# Patient Record
Sex: FEMALE | Race: WHITE
[De-identification: names, ages, dates, MRNs, and addresses within clinical notes are randomized per-mention and may not be internally consistent; named-entity substitution may affect disease eponyms.]

---

## 2019-11-19 ENCOUNTER — APPOINTMENT (OUTPATIENT)
Dept: GYNECOLOGIC ONCOLOGY | Facility: CLINIC | Age: 59
End: 2019-11-19

## 2019-11-19 PROBLEM — Z00.00 ENCOUNTER FOR PREVENTIVE HEALTH EXAMINATION: Status: ACTIVE | Noted: 2019-11-19

## 2021-10-05 ENCOUNTER — APPOINTMENT (OUTPATIENT)
Dept: BREAST CENTER | Facility: CLINIC | Age: 61
End: 2021-10-05
Payer: COMMERCIAL

## 2021-10-05 VITALS
HEIGHT: 67 IN | TEMPERATURE: 97.4 F | BODY MASS INDEX: 28.88 KG/M2 | DIASTOLIC BLOOD PRESSURE: 76 MMHG | WEIGHT: 184 LBS | SYSTOLIC BLOOD PRESSURE: 130 MMHG

## 2021-10-05 DIAGNOSIS — Z87.891 PERSONAL HISTORY OF NICOTINE DEPENDENCE: ICD-10-CM

## 2021-10-05 DIAGNOSIS — Z86.79 PERSONAL HISTORY OF OTHER DISEASES OF THE CIRCULATORY SYSTEM: ICD-10-CM

## 2021-10-05 DIAGNOSIS — Z86.39 PERSONAL HISTORY OF OTHER ENDOCRINE, NUTRITIONAL AND METABOLIC DISEASE: ICD-10-CM

## 2021-10-05 DIAGNOSIS — Z80.0 FAMILY HISTORY OF MALIGNANT NEOPLASM OF DIGESTIVE ORGANS: ICD-10-CM

## 2021-10-05 DIAGNOSIS — Z78.9 OTHER SPECIFIED HEALTH STATUS: ICD-10-CM

## 2021-10-05 PROCEDURE — 99203 OFFICE O/P NEW LOW 30 MIN: CPT

## 2021-10-05 RX ORDER — LEVOTHYROXINE SODIUM 0.17 MG/1
TABLET ORAL
Refills: 0 | Status: ACTIVE | COMMUNITY

## 2021-10-05 RX ORDER — RAMIPRIL 5 MG/1
5 CAPSULE ORAL
Refills: 0 | Status: ACTIVE | COMMUNITY

## 2021-10-05 RX ORDER — MELOXICAM 15 MG/1
TABLET ORAL
Refills: 0 | Status: ACTIVE | COMMUNITY

## 2021-10-05 RX ORDER — PANTOPRAZOLE SODIUM 20 MG/1
TABLET, DELAYED RELEASE ORAL
Refills: 0 | Status: ACTIVE | COMMUNITY

## 2021-10-05 NOTE — HISTORY OF PRESENT ILLNESS
[FreeTextEntry1] : GYN: Dr. Lois Smallwood\par \par ENMANUEL SHELTON is a 60 year old female patient who presents today to establish care.\par (Former pt of Dr. Jon)\par As per pt, she has history of benign left breast core bx x 2.  She also underwent a left breast lumpectomy with benign results.\par \par She has no breast related complaints at this time.  She denies any breast pain, has not palpated any new palpable masses in either breast and denies any nipple discharge or retraction.\par \par Her most recent imaging with  bilateral screening mammogram and sonogram was done on 11/10/2020, which revealed:\par - dense breast parenchyma.\par - postsurgical changes in the left breast are stable.\par - stable scattered nodularity in the left breast.\par BI-RADS CATEGORY 2 (Benign)\par \par HISTORICAL RISK FACTORS:\par -history of benign left breast core bx x 2.  She also underwent a left breast lumpectomy with benign results.\par -family history of colon cancer - father 83y/o.\par -, age at first live birth was 20.\par -prior OCP use x 1-2 years.\par -s/p TAHBSO (2019).\par

## 2021-10-05 NOTE — PAST MEDICAL HISTORY
[Postmenopausal] : The patient is postmenopausal [Menarche Age ____] : age at menarche was [unfilled] [Menopause Age____] : age at menopause was [unfilled] [Total Preg ___] : G[unfilled] [Live Births ___] : P[unfilled]  [Age At Live Birth ___] : Age at live birth: [unfilled] [History of Hormone Replacement Treatment] : has no history of hormone replacement treatment [FreeTextEntry6] : No. [FreeTextEntry7] : Yes. 1-2 years. [FreeTextEntry8] : Yes.

## 2021-10-05 NOTE — REVIEW OF SYSTEMS
[Negative] : Musculoskeletal [Breast Pain] : no breast pain [Breast Lump] : no breast lump [Nipple Discharge] : no nipple discharge [Nipple Inverted] : no inversion of the nipple

## 2021-10-05 NOTE — DATA REVIEWED
[FreeTextEntry1] : (complete report scanned into EMR)\par B/L Screening Mammogram & Sonogram - 11/10/2020:\par - dense breast parenchyma.\par - postsurgical changes in the left breast are stable.\par - stable scattered nodularity in the left breast.\par BI-RADS CATEGORY 2 (Benign)

## 2021-10-05 NOTE — PHYSICAL EXAM
[Normocephalic] : normocephalic [Atraumatic] : atraumatic [EOMI] : extra ocular movement intact [No Supraclavicular Adenopathy] : no supraclavicular adenopathy [No Cervical Adenopathy] : no cervical adenopathy [No dominant masses] : no dominant masses in right breast  [No dominant masses] : no dominant masses left breast [No Nipple Retraction] : no left nipple retraction [No Nipple Discharge] : no left nipple discharge [No Axillary Lymphadenopathy] : no left axillary lymphadenopathy [Soft] : abdomen soft [Not Tender] : non-tender [No Edema] : no edema [No Rashes] : no rashes [No Ulceration] : no ulceration [de-identified] : no suspicious abnormalities palpated within either breast

## 2021-10-05 NOTE — ASSESSMENT
[FreeTextEntry1] : ENMAUNEL SHELTON is a 60 year old female patient who presents today to \[facility]\"" care.\par (Former pt of Dr. Jon)\par \par She has no breast related complaints at this time.  She denies any breast pain, has not palpated any new palpable masses in either breast and denies any nipple discharge or retraction.\par \par Her most recent imaging with bilateral screening mammogram and sonogram was done on 11/10/2020, which revealed dense breast parenchyma, postsurgical changes in the left breast are stable and stable scattered nodularity in the left breast.  This was deemed BIRADS category 2.\par \par On physical exam, there are no discrete masses in either breast or axilla.  There is no nipple discharge or inversion bilaterally.  There are no skin changes bilaterally.  \par \par We discussed dense breasts.  Women who have dense breast tissue have a higher risk of breast cancer compared to women with less dense breast tissue.  It is unclear at this time why dense breast tissue is linked to breast cancer risk.  One can consider bilateral screening ultrasound for patients with heterogeneously to extremely dense breasts.  However, out of 1000 women screened, the use of routine ultrasound will only identify an additional 3-5 cancers.  The use of ultrasound was found to increase the likelihood of undergoing more imaging and more biopsies.  She does have dense breasts.  We have decided to proceed with screening bilateral breast ultrasound at this time.  This will be scheduled with her next screening mammogram.  \par \par PLAN: \par -b/l dx mammogram and US on 11/2021 \par -if unrevealing, f/up in 1 year for a CBE after a b/l screening mammogram and US

## 2021-11-15 ENCOUNTER — OUTPATIENT (OUTPATIENT)
Dept: OUTPATIENT SERVICES | Facility: HOSPITAL | Age: 61
LOS: 1 days | Discharge: HOME | End: 2021-11-15
Payer: COMMERCIAL

## 2021-11-15 ENCOUNTER — RESULT REVIEW (OUTPATIENT)
Age: 61
End: 2021-11-15

## 2021-11-15 DIAGNOSIS — R92.8 OTHER ABNORMAL AND INCONCLUSIVE FINDINGS ON DIAGNOSTIC IMAGING OF BREAST: ICD-10-CM

## 2021-11-15 PROCEDURE — 77063 BREAST TOMOSYNTHESIS BI: CPT | Mod: 26

## 2021-11-15 PROCEDURE — 77067 SCR MAMMO BI INCL CAD: CPT | Mod: 26

## 2021-11-15 PROCEDURE — 76641 ULTRASOUND BREAST COMPLETE: CPT | Mod: 26,50

## 2023-01-30 ENCOUNTER — RESULT REVIEW (OUTPATIENT)
Age: 63
End: 2023-01-30

## 2023-01-30 ENCOUNTER — OUTPATIENT (OUTPATIENT)
Dept: OUTPATIENT SERVICES | Facility: HOSPITAL | Age: 63
LOS: 1 days | Discharge: HOME | End: 2023-01-30
Payer: COMMERCIAL

## 2023-01-30 DIAGNOSIS — Z12.31 ENCOUNTER FOR SCREENING MAMMOGRAM FOR MALIGNANT NEOPLASM OF BREAST: ICD-10-CM

## 2023-01-30 PROCEDURE — 77067 SCR MAMMO BI INCL CAD: CPT | Mod: 26

## 2023-01-30 PROCEDURE — 77063 BREAST TOMOSYNTHESIS BI: CPT | Mod: 26

## 2023-02-20 ENCOUNTER — RESULT REVIEW (OUTPATIENT)
Age: 63
End: 2023-02-20

## 2023-02-20 ENCOUNTER — OUTPATIENT (OUTPATIENT)
Dept: OUTPATIENT SERVICES | Facility: HOSPITAL | Age: 63
LOS: 1 days | End: 2023-02-20
Payer: COMMERCIAL

## 2023-02-20 DIAGNOSIS — R92.2 INCONCLUSIVE MAMMOGRAM: ICD-10-CM

## 2023-02-20 DIAGNOSIS — Z00.8 ENCOUNTER FOR OTHER GENERAL EXAMINATION: ICD-10-CM

## 2023-02-20 PROCEDURE — 76641 ULTRASOUND BREAST COMPLETE: CPT | Mod: 26,50

## 2023-02-20 PROCEDURE — 76641 ULTRASOUND BREAST COMPLETE: CPT | Mod: 50

## 2023-02-21 ENCOUNTER — NON-APPOINTMENT (OUTPATIENT)
Age: 63
End: 2023-02-21

## 2023-02-22 ENCOUNTER — NON-APPOINTMENT (OUTPATIENT)
Age: 63
End: 2023-02-22

## 2023-02-23 ENCOUNTER — NON-APPOINTMENT (OUTPATIENT)
Age: 63
End: 2023-02-23

## 2023-03-13 ENCOUNTER — APPOINTMENT (OUTPATIENT)
Dept: BREAST CENTER | Facility: CLINIC | Age: 63
End: 2023-03-13
Payer: COMMERCIAL

## 2023-03-13 DIAGNOSIS — N60.19 DIFFUSE CYSTIC MASTOPATHY OF UNSPECIFIED BREAST: ICD-10-CM

## 2023-03-13 DIAGNOSIS — R92.2 INCONCLUSIVE MAMMOGRAM: ICD-10-CM

## 2023-03-13 DIAGNOSIS — R92.8 OTHER ABNORMAL AND INCONCLUSIVE FINDINGS ON DIAGNOSTIC IMAGING OF BREAST: ICD-10-CM

## 2023-03-13 PROCEDURE — 99213 OFFICE O/P EST LOW 20 MIN: CPT

## 2023-03-13 NOTE — PHYSICAL EXAM
[Normocephalic] : normocephalic [Atraumatic] : atraumatic [EOMI] : extra ocular movement intact [Examined in the supine and seated position] : examined in the supine and seated position [Symmetrical] : symmetrical [No dominant masses] : no dominant masses in right breast  [No dominant masses] : no dominant masses left breast [No Nipple Retraction] : no left nipple retraction [No Nipple Discharge] : no left nipple discharge [No Axillary Lymphadenopathy] : no left axillary lymphadenopathy [No Edema] : no edema [No Rashes] : no rashes [No Ulceration] : no ulceration [de-identified] : On physical exam, there are no discrete masses in either breast or axilla. There is no nipple discharge or inversion bilaterally. There are no skin changes bilaterally.\par \par  NPO

## 2023-03-13 NOTE — DATA REVIEWED
[FreeTextEntry1] : ACC: [id number]     EXAM:  MG MAMMO SCREEN W [facility]WRENCE BI#  \par :  01/30/2023\par INTERPRETATION:  HISTORY:\par Bilateral MG MAMMO SCREEN W [facility] BI# was performed. Patient is 62 years old and is seen for screening. The patient has no personal history of cancer. The patient has a history of left needle biopsy - benign. The patient has no family history of breast cancer.\par \par RISK ASSESSMENT:\par NCI Lifetime Risk: 6.5\par Rajiv-Neal Lifetime Risk: 6.6\par \par CLINICAL BREAST EXAM:\par The patient reports their last clinical breast exam was performed within the past year.\par \par COMPARISON STUDIES:\par The present examination has been compared to prior imaging studies performed at [facility] on 11/15/2021, and at an outside location on 01/17/2019, 10/04/2019, 02/14/2020 and 11/10/2020.\par \par MAMMOGRAM FINDINGS:\par Mammography was performed including the following views: bilateral craniocaudal with tomosynthesis, bilateral mediolateral oblique with tomosynthesis.  The examination includes digital synthetic 2D and digital tomosynthesis 3D images. Additional imaging analysis was performed using CAD (computer-aided detection) software.\par \par The breasts are heterogeneously dense, which may obscure small masses.\par \par There are stable benign masses seen in both breasts.\par \par No suspicious mass, grouping of calcifications, or other abnormality is identified.\par \par IMPRESSION:\par There is no mammographic evidence of malignancy.\par \par RECOMMENDATION:\par Unless otherwise indicated by clinical findings, annual screening mammography recommended.\par \par ASSESSMENT:\par BI-RADS Category 2:  Benign\par  ACC: [id number]     EXAM:  MG US BREAST COMPLETE BI   ORDERED BY: ANIRUDH MATHIS\par \par PROCEDURE DATE:  02/20/2023\par \par \par \par INTERPRETATION:  CLINICAL HISTORY: Fibrocystic breast changes. Dense breast screening.\par \par COMPARISON: 11/15/2021.\par \par Last clinical breast examination was performed within the past year.\par \par FAMILY HISTORY: None.\par \par TECHNIQUE: Each breast was scanned in its entirety.\par \par \par FINDINGS:\par \par Oval, circumscribed, parallel, hypoechoic mass in the left breast, 2:00 axis, 7 cm from the nipple measures 0.7 x 0.5 x 0.3 cm. This is probably benign and continued sonographic follow-up is recommended to demonstrate stability. Stable, oval, hypoechoic mass in the left breast, 12:00 axis, 6 7 cm from the nipple measures 1.3 x 1.3 x 0.4 cm, unchanged from 11/15/2021 consistent with a benign etiology. No suspicious masses are identified in either breast.\par \par There is no axillary adenopathy.\par \par \par IMPRESSION:\par \par 1. Probably benign left breast mass as above. Sonographic follow-up is recommended in 6 months to demonstrate stability.\par 2. No sonographic evidence of malignancy in the right breast.\par \par Recommendation: Follow-up breast ultrasound in 6 months.\par \par BI-RADS category 3: Probably Benign\par \par

## 2023-03-13 NOTE — HISTORY OF PRESENT ILLNESS
[FreeTextEntry1] : GYN: Dr. Lois Smallwood\par \par FIDENCIO SHELTON is a 60 year old female patient who presents today to establish care.\par (Former pt of Dr. Jon)\par As per pt, she has history of benign left breast core bx x 2.  She also underwent a left breast lumpectomy with benign results.\par \par She has no breast related complaints at this time.  She denies any breast pain, has not palpated any new palpable masses in either breast and denies any nipple discharge or retraction.\par \par Her most recent imaging with  bilateral screening mammogram and sonogram was done on 11/10/2020, which revealed:\par - dense breast parenchyma.\par - postsurgical changes in the left breast are stable.\par - stable scattered nodularity in the left breast.\par BI-RADS CATEGORY 2 (Benign)\par \par HISTORICAL RISK FACTORS:\par -history of benign left breast core bx x 2.  She also underwent a left breast lumpectomy with benign results.\par -family history of colon cancer - father 81y/o.\par -, age at first live birth was 20.\par -prior OCP use x 1-2 years.\par -s/p TAHBSO (2019).\par \par \par INTERVAL HISTORY 3/13/23\par Fidencio is 62 year old female here for her follow up and to discuss her new BIRADS3 abnormality \par She has no breast related complaints at this time.  She denies any breast pain, has not palpated any new palpable masses in either breast and denies any nipple discharge or retraction.\par \par Her imaging is as follows:\par 2023 b/l mammo and US\par -breasts are heterogeneously dense\par - stable benign masses seen in both breasts.\par BIRADS2\par \par \par 2023 B/L US\par LEFT US:\par -Oval, circumscribed, parallel, hypoechoic mass @2N7  measures 0.7 x 0.5 x 0.3 cm-->This is probably benign and continued sonographic follow-up is recommended to demonstrate stability. \par -Stable, oval, hypoechoic mass in the left breast, 12N 6 7  measures 1.3 x 1.3 x 0.4 cm, unchanged from 11/15/2021 consistent with a benign etiology.\par BIRADS3

## 2023-03-13 NOTE — ASSESSMENT
[FreeTextEntry1] : ENMANUEL SHELTON is a 62 year old female patient with new BIRADS3 abnormality \par \par She has no breast related complaints at this time.  She denies any breast pain, has not palpated any new palpable masses in either breast and denies any nipple discharge or retraction.\par Her imaging is as follows:\par 01/30/2023 b/l mammo and US\par -breasts are heterogeneously dense\par - stable benign masses seen in both breasts.\par BIRADS2\par \par \par 02/20/2023 B/L US\par LEFT US:\par -Oval, circumscribed, parallel, hypoechoic mass @2N7  measures 0.7 x 0.5 x 0.3 cm-->This is probably benign and continued sonographic follow-up is recommended to demonstrate stability. \par -Stable, oval, hypoechoic mass in the left breast, 12N 6 7  measures 1.3 x 1.3 x 0.4 cm, unchanged from 11/15/2021 consistent with a benign etiology.\par BIRADS3\par \par On physical exam, there are no discrete masses in either breast or axilla.  There is no nipple discharge or inversion bilaterally.  There are no skin changes bilaterally.  \par \par \par We also discussed BIRADS 3 lesions.  These lesions have a 2% chance of harboring malignancy.  There is always an option to obtain a tissue sample with a biopsy to confirm the diagnosis.   The procedure was described in detail including the placement of a tissue marker clip.  The risks of the procedure, including but not limited to bleeding and infection were also explained.  She is not interested in biopsy at this time and agrees to continue with short-term interval imaging.\par \par \par We discussed dense breasts.  Women who have dense breast tissue have a higher risk of breast cancer compared to women with less dense breast tissue.  It is unclear at this time why dense breast tissue is linked to breast cancer risk.  One can consider bilateral screening ultrasound for patients with heterogeneously to extremely dense breasts.  However, out of 1000 women screened, the use of routine ultrasound will only identify an additional 3-5 cancers.  The use of ultrasound was found to increase the likelihood of undergoing more imaging and more biopsies.  She does have dense breasts.  We have decided to proceed with screening bilateral breast ultrasound at this time.  This will be scheduled with her next screening mammogram.  \par \par PLAN: \par -LEFT US on 8/21/23\par -follow up after

## 2023-03-20 ENCOUNTER — APPOINTMENT (OUTPATIENT)
Dept: PLASTIC SURGERY | Facility: CLINIC | Age: 63
End: 2023-03-20
Payer: COMMERCIAL

## 2023-03-20 VITALS — WEIGHT: 180 LBS | BODY MASS INDEX: 28.25 KG/M2 | HEIGHT: 67 IN

## 2023-03-20 PROCEDURE — 99214 OFFICE O/P EST MOD 30 MIN: CPT

## 2023-03-20 RX ORDER — IBUPROFEN 200 MG/1
TABLET, COATED ORAL
Refills: 0 | Status: ACTIVE | COMMUNITY

## 2023-03-20 RX ORDER — MV-MIN/FOLIC/VIT K/LYCOP/COQ10 200-100MCG
CAPSULE ORAL
Refills: 0 | Status: ACTIVE | COMMUNITY

## 2023-03-20 NOTE — PHYSICAL EXAM
[de-identified] : well developed female, NAD [de-identified] : NC/AT,  central forehead with bony prominence measuring 1x0.8 cm, non tender, no overlying skin changes, intact forehead animation without soft tissue tethering on animation.  Facial nerve exam intact  [de-identified] : supple [de-identified] : unlabored breathing  [de-identified] : ERICR [de-identified] : soft, nontender  [de-identified] : Left hand - warm and well perfused, FROM/SILT, MF with small cystic subcutaneous mass over volar aspect of PIPJ, no tethering to underlying FTS

## 2023-03-20 NOTE — ASSESSMENT
[FreeTextEntry1] : 61 yo F with central forehead exostosis and left MF PIPJ subcutaneous cyst. \par \par - Recommend excision of central forehead exostosis and left middle finger cyst\par -I reviewed the treatment options of observation vs. surgical excision, and I discussed the benefits, risks, and outcomes of each treatment option.\par -Regarding observation, the risks include progressive mass enlargement, mass effect of surrounding structures.\par -Regarding surgical excision of mass, the risks include but are not limited to bleeding, infection, hematoma,  numbness, wound separation, poor wound healing, recurrence of mass, contour defect, scarring, hypertrophic scar/keloid formation, need for unplanned surgery, and dissatisfaction with outcome.\par -The patient understands the risks. All questions were answered to the patient's apparent satisfaction.\par -Informed consent was obtained.\par -will schedule for TASNEEM procedures under [facility] ([facility])\par \par Due to COVID-19, pre-visit patient instructions were explained to the patient and their symptoms were checked upon arrival. Masks were used by the healthcare provider and staff and the examination room was cleaned after the patient visit concluded\par

## 2023-03-20 NOTE — HISTORY OF PRESENT ILLNESS
[FreeTextEntry1] : 63 yo F with PMHx of HTN, Hypothyroid and GERD who is RHD and presents today for evaluation of new firm forehead lesion for the past several months and left middle finger cyst for few years with increasing size and slight discomfort on contact. Denies any paresthesia, numbness in digits. \par Pt has h/o premalignant back skin lesion otherwise no h/o Skin Cancer. \par No imaging. \par \par Occupation - retired \par Former smoker \par \par Denies ASA\par \par

## 2023-04-04 ENCOUNTER — OUTPATIENT (OUTPATIENT)
Dept: OUTPATIENT SERVICES | Facility: HOSPITAL | Age: 63
LOS: 1 days | End: 2023-04-04
Payer: COMMERCIAL

## 2023-04-04 ENCOUNTER — RESULT REVIEW (OUTPATIENT)
Age: 63
End: 2023-04-04

## 2023-04-04 VITALS
OXYGEN SATURATION: 99 % | SYSTOLIC BLOOD PRESSURE: 119 MMHG | HEART RATE: 73 BPM | TEMPERATURE: 99 F | DIASTOLIC BLOOD PRESSURE: 76 MMHG | HEIGHT: 67 IN | WEIGHT: 181.44 LBS | RESPIRATION RATE: 15 BRPM

## 2023-04-04 DIAGNOSIS — Z98.890 OTHER SPECIFIED POSTPROCEDURAL STATES: Chronic | ICD-10-CM

## 2023-04-04 DIAGNOSIS — Z01.818 ENCOUNTER FOR OTHER PREPROCEDURAL EXAMINATION: ICD-10-CM

## 2023-04-04 DIAGNOSIS — M89.8X9 OTHER SPECIFIED DISORDERS OF BONE, UNSPECIFIED SITE: ICD-10-CM

## 2023-04-04 LAB
ALBUMIN SERPL ELPH-MCNC: 5.1 G/DL — SIGNIFICANT CHANGE UP (ref 3.5–5.2)
ALP SERPL-CCNC: 80 U/L — SIGNIFICANT CHANGE UP (ref 30–115)
ALT FLD-CCNC: 17 U/L — SIGNIFICANT CHANGE UP (ref 0–41)
ANION GAP SERPL CALC-SCNC: 16 MMOL/L — HIGH (ref 7–14)
APTT BLD: 30 SEC — SIGNIFICANT CHANGE UP (ref 27–39.2)
AST SERPL-CCNC: 17 U/L — SIGNIFICANT CHANGE UP (ref 0–41)
BASOPHILS # BLD AUTO: 0.03 K/UL — SIGNIFICANT CHANGE UP (ref 0–0.2)
BASOPHILS NFR BLD AUTO: 0.4 % — SIGNIFICANT CHANGE UP (ref 0–1)
BILIRUB SERPL-MCNC: 0.4 MG/DL — SIGNIFICANT CHANGE UP (ref 0.2–1.2)
BUN SERPL-MCNC: 20 MG/DL — SIGNIFICANT CHANGE UP (ref 10–20)
CALCIUM SERPL-MCNC: 9.5 MG/DL — SIGNIFICANT CHANGE UP (ref 8.4–10.5)
CHLORIDE SERPL-SCNC: 103 MMOL/L — SIGNIFICANT CHANGE UP (ref 98–110)
CO2 SERPL-SCNC: 24 MMOL/L — SIGNIFICANT CHANGE UP (ref 17–32)
CREAT SERPL-MCNC: 0.8 MG/DL — SIGNIFICANT CHANGE UP (ref 0.7–1.5)
EGFR: 83 ML/MIN/1.73M2 — SIGNIFICANT CHANGE UP
EOSINOPHIL # BLD AUTO: 0.12 K/UL — SIGNIFICANT CHANGE UP (ref 0–0.7)
EOSINOPHIL NFR BLD AUTO: 1.7 % — SIGNIFICANT CHANGE UP (ref 0–8)
GLUCOSE SERPL-MCNC: 77 MG/DL — SIGNIFICANT CHANGE UP (ref 70–99)
HCT VFR BLD CALC: 40.9 % — SIGNIFICANT CHANGE UP (ref 37–47)
HGB BLD-MCNC: 13 G/DL — SIGNIFICANT CHANGE UP (ref 12–16)
IMM GRANULOCYTES NFR BLD AUTO: 0.3 % — SIGNIFICANT CHANGE UP (ref 0.1–0.3)
INR BLD: 0.92 RATIO — SIGNIFICANT CHANGE UP (ref 0.65–1.3)
LYMPHOCYTES # BLD AUTO: 3.18 K/UL — SIGNIFICANT CHANGE UP (ref 1.2–3.4)
LYMPHOCYTES # BLD AUTO: 45.2 % — SIGNIFICANT CHANGE UP (ref 20.5–51.1)
MCHC RBC-ENTMCNC: 29.1 PG — SIGNIFICANT CHANGE UP (ref 27–31)
MCHC RBC-ENTMCNC: 31.8 G/DL — LOW (ref 32–37)
MCV RBC AUTO: 91.5 FL — SIGNIFICANT CHANGE UP (ref 81–99)
MONOCYTES # BLD AUTO: 0.53 K/UL — SIGNIFICANT CHANGE UP (ref 0.1–0.6)
MONOCYTES NFR BLD AUTO: 7.5 % — SIGNIFICANT CHANGE UP (ref 1.7–9.3)
NEUTROPHILS # BLD AUTO: 3.16 K/UL — SIGNIFICANT CHANGE UP (ref 1.4–6.5)
NEUTROPHILS NFR BLD AUTO: 44.9 % — SIGNIFICANT CHANGE UP (ref 42.2–75.2)
NRBC # BLD: 0 /100 WBCS — SIGNIFICANT CHANGE UP (ref 0–0)
PLATELET # BLD AUTO: 288 K/UL — SIGNIFICANT CHANGE UP (ref 130–400)
POTASSIUM SERPL-MCNC: 4.2 MMOL/L — SIGNIFICANT CHANGE UP (ref 3.5–5)
POTASSIUM SERPL-SCNC: 4.2 MMOL/L — SIGNIFICANT CHANGE UP (ref 3.5–5)
PROT SERPL-MCNC: 7.1 G/DL — SIGNIFICANT CHANGE UP (ref 6–8)
PROTHROM AB SERPL-ACNC: 10.5 SEC — SIGNIFICANT CHANGE UP (ref 9.95–12.87)
RBC # BLD: 4.47 M/UL — SIGNIFICANT CHANGE UP (ref 4.2–5.4)
RBC # FLD: 14 % — SIGNIFICANT CHANGE UP (ref 11.5–14.5)
SODIUM SERPL-SCNC: 143 MMOL/L — SIGNIFICANT CHANGE UP (ref 135–146)
WBC # BLD: 7.04 K/UL — SIGNIFICANT CHANGE UP (ref 4.8–10.8)
WBC # FLD AUTO: 7.04 K/UL — SIGNIFICANT CHANGE UP (ref 4.8–10.8)

## 2023-04-04 PROCEDURE — 99214 OFFICE O/P EST MOD 30 MIN: CPT | Mod: 25

## 2023-04-04 PROCEDURE — 85610 PROTHROMBIN TIME: CPT

## 2023-04-04 PROCEDURE — 93010 ELECTROCARDIOGRAM REPORT: CPT

## 2023-04-04 PROCEDURE — 71046 X-RAY EXAM CHEST 2 VIEWS: CPT

## 2023-04-04 PROCEDURE — 36415 COLL VENOUS BLD VENIPUNCTURE: CPT

## 2023-04-04 PROCEDURE — 85025 COMPLETE CBC W/AUTO DIFF WBC: CPT

## 2023-04-04 PROCEDURE — 85730 THROMBOPLASTIN TIME PARTIAL: CPT

## 2023-04-04 PROCEDURE — 71046 X-RAY EXAM CHEST 2 VIEWS: CPT | Mod: 26

## 2023-04-04 PROCEDURE — 93005 ELECTROCARDIOGRAM TRACING: CPT

## 2023-04-04 PROCEDURE — 80053 COMPREHEN METABOLIC PANEL: CPT

## 2023-04-04 NOTE — H&P PST ADULT - HISTORY OF PRESENT ILLNESS
PT PRESENTS TO PAST WITH NO SOB, CP, PALPITATIONS, DYSURIA, UTI OR URI AT PRESENT.   PT ABLE TO WALK UP 2-3 FLIGHTS OF STEPS WITH NO SOB.  AS PER THE PT, THIS IS HIS/HER COMPLETE MEDICAL AND SURGICAL HX, INCLUDING MEDICATIONS PRESCRIBED AND OVER THE COUNTER  pt denies any covid s/s, or tested positive in the past  pt advised self quarantine till day of procedure  denies travel outside the [place] in the past 30 days  Anesthesia Alert  NO--Difficult Airway  NO--History of neck surgery or radiation  NO--Limited ROM of neck  NO--History of Malignant hyperthermia  NO--Personal or family history of Pseudocholinesterase deficiency  NO--Prior Anesthesia Complication  NO--Latex Allergy  NO--Loose teeth  NO--History of Rheumatoid Arthritis  NO--NAVNEET  NO BLEEDING RISK  NO--Other_____

## 2023-04-04 NOTE — H&P PST ADULT - NSICDXPASTMEDICALHX_GEN_ALL_CORE_FT
PAST MEDICAL HISTORY:  GERD (gastroesophageal reflux disease)     HTN (hypertension)     Hypothyroidism

## 2023-04-04 NOTE — H&P PST ADULT - REASON FOR ADMISSION
Proceduralist: Vivian Gay  Procedure: FOREHEAD REMOVAL OF EXOSTOSIS EXCISION OF LEFT MIDDLE FINGER MASS  Procedure: 60 Minutes  Anesthesia Type: General  PT STATES- I HAVE A LUMP ON MY FOREHEAD AND ON MY LEFT MIDDLE FINGER FOR A FEW A COUPLE OF YEARS.   PT DENIES HAVING ANY PAIN

## 2023-04-04 NOTE — H&P PST ADULT - ADDITIONAL PE
AIRWAY CLASS--3  PT HAS UPPER & LOWER CROWNS- PT DENIES HAVING ANY LOOSE TEETH AIRWAY CLASS--3  PT HAS UPPER & LOWER CROWNS- PT DENIES HAVING ANY LOOSE TEETH  exostosis  on forehead noted- round raised no drainage  left middle finger mass noted- round- non tender no drainage noted.

## 2023-04-05 DIAGNOSIS — Z01.818 ENCOUNTER FOR OTHER PREPROCEDURAL EXAMINATION: ICD-10-CM

## 2023-04-05 DIAGNOSIS — M89.8X9 OTHER SPECIFIED DISORDERS OF BONE, UNSPECIFIED SITE: ICD-10-CM

## 2023-04-24 NOTE — ASU PATIENT PROFILE, ADULT - NSICDXPASTMEDICALHX_GEN_ALL_CORE_FT
PAST MEDICAL HISTORY:  GERD (gastroesophageal reflux disease)     H/O varicose veins     H/O: pneumonia s/p hysterectomy    HTN (hypertension)     Hypothyroidism

## 2023-04-24 NOTE — ASU PATIENT PROFILE, ADULT - NSICDXPASTSURGICALHX_GEN_ALL_CORE_FT
PAST SURGICAL HISTORY:  H/O left breast biopsy     H/O ligation of vein left leg    S/P hysterectomy     S/P left knee arthroscopy

## 2023-04-24 NOTE — ASU PATIENT PROFILE, ADULT - FALL HARM RISK - UNIVERSAL INTERVENTIONS
Bed in lowest position, wheels locked, appropriate side rails in place/Call bell, personal items and telephone in reach/Instruct patient to call for assistance before getting out of bed or chair/Non-slip footwear when patient is out of bed/[place] to call system/Physically safe environment - no spills, clutter or unnecessary equipment/Purposeful Proactive Rounding/Room/bathroom lighting operational, light cord in reach

## 2023-04-25 ENCOUNTER — APPOINTMENT (OUTPATIENT)
Dept: PLASTIC SURGERY | Facility: AMBULATORY SURGERY CENTER | Age: 63
End: 2023-04-25
Payer: COMMERCIAL

## 2023-04-25 ENCOUNTER — RESULT REVIEW (OUTPATIENT)
Age: 63
End: 2023-04-25

## 2023-04-25 ENCOUNTER — OUTPATIENT (OUTPATIENT)
Dept: INPATIENT UNIT | Facility: HOSPITAL | Age: 63
LOS: 1 days | Discharge: HOME | End: 2023-04-25
Payer: COMMERCIAL

## 2023-04-25 ENCOUNTER — TRANSCRIPTION ENCOUNTER (OUTPATIENT)
Age: 63
End: 2023-04-25

## 2023-04-25 VITALS
OXYGEN SATURATION: 99 % | RESPIRATION RATE: 18 BRPM | TEMPERATURE: 98 F | SYSTOLIC BLOOD PRESSURE: 124 MMHG | WEIGHT: 181.44 LBS | HEIGHT: 67 IN | HEART RATE: 63 BPM | DIASTOLIC BLOOD PRESSURE: 80 MMHG

## 2023-04-25 VITALS — SYSTOLIC BLOOD PRESSURE: 108 MMHG | DIASTOLIC BLOOD PRESSURE: 64 MMHG | TEMPERATURE: 98 F | HEART RATE: 62 BPM

## 2023-04-25 DIAGNOSIS — Z98.890 OTHER SPECIFIED POSTPROCEDURAL STATES: Chronic | ICD-10-CM

## 2023-04-25 DIAGNOSIS — D23.62 OTHER BENIGN NEOPLASM OF SKIN OF LEFT UPPER LIMB, INCLUDING SHOULDER: ICD-10-CM

## 2023-04-25 DIAGNOSIS — M67.442 GANGLION, LEFT HAND: ICD-10-CM

## 2023-04-25 DIAGNOSIS — Z90.710 ACQUIRED ABSENCE OF BOTH CERVIX AND UTERUS: Chronic | ICD-10-CM

## 2023-04-25 DIAGNOSIS — K21.9 GASTRO-ESOPHAGEAL REFLUX DISEASE WITHOUT ESOPHAGITIS: ICD-10-CM

## 2023-04-25 DIAGNOSIS — D16.9 BENIGN NEOPLASM OF BONE AND ARTICULAR CARTILAGE, UNSPECIFIED: ICD-10-CM

## 2023-04-25 DIAGNOSIS — E03.9 HYPOTHYROIDISM, UNSPECIFIED: ICD-10-CM

## 2023-04-25 DIAGNOSIS — M89.8X9 OTHER SPECIFIED DISORDERS OF BONE, UNSPECIFIED SITE: ICD-10-CM

## 2023-04-25 DIAGNOSIS — I10 ESSENTIAL (PRIMARY) HYPERTENSION: ICD-10-CM

## 2023-04-25 PROCEDURE — 88305 TISSUE EXAM BY PATHOLOGIST: CPT

## 2023-04-25 PROCEDURE — 88342 IMHCHEM/IMCYTCHM 1ST ANTB: CPT | Mod: 26

## 2023-04-25 PROCEDURE — 88342 IMHCHEM/IMCYTCHM 1ST ANTB: CPT

## 2023-04-25 PROCEDURE — 88341 IMHCHEM/IMCYTCHM EA ADD ANTB: CPT

## 2023-04-25 PROCEDURE — 13131 CMPLX RPR F/C/C/M/N/AX/G/H/F: CPT | Mod: XS

## 2023-04-25 PROCEDURE — 88341 IMHCHEM/IMCYTCHM EA ADD ANTB: CPT | Mod: 26

## 2023-04-25 PROCEDURE — 21029 CONTOUR OF FACE BONE LESION: CPT

## 2023-04-25 PROCEDURE — 88311 DECALCIFY TISSUE: CPT | Mod: 26

## 2023-04-25 PROCEDURE — 88305 TISSUE EXAM BY PATHOLOGIST: CPT | Mod: 26

## 2023-04-25 PROCEDURE — C9399: CPT

## 2023-04-25 PROCEDURE — 88311 DECALCIFY TISSUE: CPT

## 2023-04-25 PROCEDURE — 11421 EXC H-F-NK-SP B9+MARG 0.6-1: CPT

## 2023-04-25 RX ORDER — HYDROMORPHONE HYDROCHLORIDE 2 MG/ML
0.5 INJECTION INTRAMUSCULAR; INTRAVENOUS; SUBCUTANEOUS
Refills: 0 | Status: DISCONTINUED | OUTPATIENT
Start: 2023-04-25 | End: 2023-04-25

## 2023-04-25 RX ORDER — SODIUM CHLORIDE 9 MG/ML
1000 INJECTION, SOLUTION INTRAVENOUS
Refills: 0 | Status: DISCONTINUED | OUTPATIENT
Start: 2023-04-25 | End: 2023-04-25

## 2023-04-25 RX ORDER — OSPEMIFENE 60 MG/1
1 TABLET, FILM COATED ORAL
Refills: 0 | DISCHARGE

## 2023-04-25 RX ORDER — TRAMADOL HYDROCHLORIDE 50 MG/1
1 TABLET ORAL
Qty: 12 | Refills: 0
Start: 2023-04-25 | End: 2023-04-27

## 2023-04-25 RX ORDER — AMLODIPINE BESYLATE AND BENAZEPRIL HYDROCHLORIDE 10; 20 MG/1; MG/1
1 CAPSULE ORAL
Refills: 0 | DISCHARGE

## 2023-04-25 RX ADMIN — SODIUM CHLORIDE 100 MILLILITER(S): 9 INJECTION, SOLUTION INTRAVENOUS at 11:57

## 2023-04-25 NOTE — CHART NOTE - NSCHARTNOTEFT_GEN_A_CORE
PACU ANESTHESIA ADMISSION NOTE      Procedure: Excision of exostosis  forehead    Excision of benign skin lesion (excluding skin tags) of hand, 0.6 to 1.0cm  left middle finger      Post op diagnosis:  Bony exostosis        ____  Intubated  TV:______       Rate: ______      FiO2: ______    __x__  Patent Airway    __x__  Full return of protective reflexes    ____  Full recovery from anesthesia / back to baseline     Vitals:   T:  97.9         R:     14             BP: 112/73                 Sat:       98%            P: 76      Mental Status:  __x__ Awake   __x___ Alert   _____ Drowsy   _____ Sedated    Nausea/Vomiting:  __x__ NO  ______Yes,   See Post - Op Orders          Pain Scale (0-10):  ___0__    Treatment: ____ None    ____ See Post - Op/PCA Orders    Post - Operative Fluids:   ____ Oral   _x___ See Post - Op Orders    Plan: Discharge:   _x___Home       _____Floor     _____Critical Care    _____  Other:_________________    Comments: PAtient hemodynamically stable. HAndoff endorsed to PACU RN. No anesthesia related issues present. To be discharged home when criteria met

## 2023-04-25 NOTE — ASU DISCHARGE PLAN (ADULT/PEDIATRIC) - ASU DC SPECIAL INSTRUCTIONSFT
Keep surgical site clean and dry.   Do not remove any dressings or get them wet.   Sleep on your back with head elevated to reduce swelling.   Keep left hand elevated also.   Do not be alarmed by facial bruising, swelling and minor bleeding, that's expected.   May apply ice pack on-and-off for the first 24 hours to the forehead to reduce swelling.   Rest, no lifting.

## 2023-04-25 NOTE — ASU DISCHARGE PLAN (ADULT/PEDIATRIC) - CARE PROVIDER_API CALL
Vivian Gay)  Plastic Surgery  [address] [place] [address], [address]  [place] [address]  Phone: [phone]  Fax: [phone]  Follow Up Time:

## 2023-04-25 NOTE — PRE-ANESTHESIA EVALUATION ADULT - HEIGHT IN FEET
----- Message from Maryjo Delgado sent at 3/13/2018  4:53 PM CDT -----  Contact: self - [phone]  sridhar - has appt on 3/27 - is asking for her appt on the Monday or the Wednesday - please call patient at    5

## 2023-04-25 NOTE — BRIEF OPERATIVE NOTE - NSICDXBRIEFPREOP_GEN_ALL_CORE_FT
PRE-OP DIAGNOSIS:  Bony exostosis 25-Apr-2023 11:46:47 forehead Ursula Wilkins  Skin lesion of hand 25-Apr-2023 11:47:50 left middle finger Ursula Wilkins

## 2023-04-25 NOTE — BRIEF OPERATIVE NOTE - NSICDXBRIEFPROCEDURE_GEN_ALL_CORE_FT
PROCEDURES:  Excision of exostosis 25-Apr-2023 11:48:21 forehead Ursula Wilkins  Excision of benign skin lesion (excluding skin tags) of hand, 0.6 to 1.0cm 25-Apr-2023 11:49:31 left middle finger Ursula Wilkins

## 2023-04-26 ENCOUNTER — TRANSCRIPTION ENCOUNTER (OUTPATIENT)
Age: 63
End: 2023-04-26

## 2023-05-03 ENCOUNTER — APPOINTMENT (OUTPATIENT)
Dept: PLASTIC SURGERY | Facility: CLINIC | Age: 63
End: 2023-05-03
Payer: COMMERCIAL

## 2023-05-03 DIAGNOSIS — M67.449 GANGLION, UNSPECIFIED HAND: ICD-10-CM

## 2023-05-03 PROBLEM — E03.9 HYPOTHYROIDISM, UNSPECIFIED: Chronic | Status: ACTIVE | Noted: 2023-04-04

## 2023-05-03 PROBLEM — Z87.01 PERSONAL HISTORY OF PNEUMONIA (RECURRENT): Chronic | Status: ACTIVE | Noted: 2023-04-25

## 2023-05-03 PROBLEM — I10 ESSENTIAL (PRIMARY) HYPERTENSION: Chronic | Status: ACTIVE | Noted: 2023-04-04

## 2023-05-03 PROBLEM — K21.9 GASTRO-ESOPHAGEAL REFLUX DISEASE WITHOUT ESOPHAGITIS: Chronic | Status: ACTIVE | Noted: 2023-04-04

## 2023-05-03 PROBLEM — Z86.79 PERSONAL HISTORY OF OTHER DISEASES OF THE CIRCULATORY SYSTEM: Chronic | Status: ACTIVE | Noted: 2023-04-25

## 2023-05-03 PROCEDURE — 99024 POSTOP FOLLOW-UP VISIT: CPT

## 2023-05-03 NOTE — HISTORY OF PRESENT ILLNESS
[FreeTextEntry1] : 63 yo F with PMHx of HTN, Hypothyroid and GERD who is RHD and presents today for evaluation of new firm forehead lesion for the past several months and left middle finger cyst for few years with increasing size and slight discomfort on contact. Denies any paresthesia, numbness in digits. \par Pt has h/o premalignant back skin lesion otherwise no h/o Skin Cancer. \par No imaging. \par \par Occupation - retired \par Former smoker \par \par Denies ASA\par \par Interval hx (5/3/23). Pt here POD#8 s/p excision of forehead exostosis and left MF skin lesion. Doing well c/o facial bruising and swelling. Denies any f/c, bleeding or limited ROM of MF.

## 2023-05-03 NOTE — ASSESSMENT
[FreeTextEntry1] : 63 yo F with central forehead exostosis and left MF PIPJ subcutaneous cyst. \par \par Now POD#8 s/p excision of central forehead exostosis and left middle finger cyst. Doing well. \par \par - forehead sutures removed, steri strip applied\par - may get face wet\par - finger dressing changed with Xeroform/Bacitracin/jovanna\par - awaiting pathology \par - post-op instructions reviewed and all her questions were answered to the patient's apparent satisfaction\par - f/u next week for suture removal, pathology and scar management

## 2023-05-03 NOTE — PHYSICAL EXAM
[de-identified] : well developed female, NAD [de-identified] : central forehead incision healing well, c/d/i, slight residual bruising and swelling present, intact forehead animation without soft tissue tethering on animation.  Facial nerve exam intact  [de-identified] : ERICR [de-identified] : unlabored breathing  [de-identified] : soft, nontender  [de-identified] : Left hand - warm and well perfused, FROM/SILT, incision over volar aspect of MF PIPJ healing well, c/d/i, no erythema

## 2023-05-04 LAB — SURGICAL PATHOLOGY STUDY: SIGNIFICANT CHANGE UP

## 2023-05-10 ENCOUNTER — APPOINTMENT (OUTPATIENT)
Dept: PLASTIC SURGERY | Facility: CLINIC | Age: 63
End: 2023-05-10
Payer: COMMERCIAL

## 2023-05-10 DIAGNOSIS — M89.8X9 OTHER SPECIFIED DISORDERS OF BONE, UNSPECIFIED SITE: ICD-10-CM

## 2023-05-10 DIAGNOSIS — D23.60 OTHER BENIGN NEOPLASM OF SKIN OF UNSPECIFIED UPPER LIMB, INCLUDING SHOULDER: ICD-10-CM

## 2023-05-10 PROCEDURE — 99024 POSTOP FOLLOW-UP VISIT: CPT

## 2023-05-10 NOTE — DATA REVIEWED
[FreeTextEntry1] : \par  Pathology             Final\par \par No Documents Attached\par \par \par \par \par   [facility] Accession Number : [id number]\par Patient:   ENMANUEL SHELTON\par \par \par Accession:                             [id number]\par \par Collected Date/Time:                   4/25/2023 11:25 EDT\par Received Date/Time:                    4/25/2023 14:30 EDT\par \par Surgical Pathology Report - Auth (Verified)\par \par Specimen(s) Submitted\par 1  Exostosis forehead\par 2  Left middle finger skin lesion\par \par Final Diagnosis\par 1.  Exostosis, forehead:\par -  Dense cortical bone tissue consistent with exostosis.\par \par \par 2.  Skin lesion, left middle finger:\par -  Dermatofibroma.\par Verified by: Paris Syed M.D.\par (Electronic Signature)\par Reported on: 05/04/23 16:45 EDT, [facility],\par [address] [place] [address]\par Phone: [phone]   Fax: [phone]\par _________________________________________________________________\par \par \par Clinical Information\par Excision of forehead exostosis and left finger mass\par \par Perioperative Diagnosis\par Forehead exostosis and left middle finger mass\par \par Gross Description\par 1.  The specimen is received in formalin labeled "exostosis forehead" and\par consists of a tan-white soft tissue fragment measuring 0.6 x 0.3 x 0.3\par cm.  The specimen is entirely submitted.  (1 block)\par \par 2.  The specimen is received in formalin labeled "left middle finger skin\par lesion" and consists of two tan-white soft tissue fragments measuring\par 0.7 x 0.6 x 0.2 cm in aggregate.  The specimen is entirely submitted.  (1\par block)\par \par Specimen was received and underwent gross examination at [place]\[facility], [address] [place] [address].\par \par 04/25/2023 17:12:20 EDT  rc\par \par  \par \par  Ordered by: JUSTUS MAYO       Collected/Examined: 25Apr2023 11:25AM       \par Verification Required       Stage: Final       \par  Performed at: [facility]       Resulted: [id number] 04:45PM       Last Updated: 04May2023 04:45PM       Accession: [id number]

## 2023-05-10 NOTE — ASSESSMENT
[FreeTextEntry1] : 61 yo F with central forehead exostosis and left MF PIPJ subcutaneous cyst. \par \par Now 2 weeks s/p excision of central forehead exostosis and left middle finger dermatofibroma. Doing well. \par \par - d/c sutures\par - scar management discussed, ok to start silicone scar cream on forehead \par - path reviewed; Exostosis & dermatofibroma\par - forehead sutures removed, steri strip applied\par - may get face wet\par - finger aquaphor daily \par - post-op instructions reviewed and all her questions were answered to the patient's apparent satisfaction\par - f/u 6 weeks with , pt also interested in botox at  that time

## 2023-05-10 NOTE — PHYSICAL EXAM
[de-identified] : well developed female, NAD [de-identified] : central forehead incision healing well, c/d/i, slight residual swelling present, intact forehead animation without soft tissue tethering on animation.  Facial nerve exam intact  [de-identified] : unlabored breathing  [de-identified] : ERICR [de-identified] : soft, nontender  [de-identified] : Left hand - warm and well perfused, FROM/SILT, incision over volar aspect of MF PIPJ healing well, c/d/i, no erythema

## 2023-05-10 NOTE — HISTORY OF PRESENT ILLNESS
[FreeTextEntry1] : 63 yo F with PMHx of HTN, Hypothyroid and GERD who is RHD and presents today for evaluation of new firm forehead lesion for the past several months and left middle finger cyst for few years with increasing size and slight discomfort on contact. Denies any paresthesia, numbness in digits. \par Pt has h/o premalignant back skin lesion otherwise no h/o Skin Cancer. \par No imaging. \par \par Occupation - retired \par Former smoker \par \par Denies ASA\par \par Interval hx (5/3/23). Pt here POD#8 s/p excision of forehead exostosis and left MF skin lesion. Doing well c/o facial bruising and swelling. Denies any f/c, bleeding or limited ROM of MF. \par \par Interval hx (5/10/23): Pt 2 weeks s/p excision of forehead exostosis and left MF dermatofibroma. Doing well however concerved over forehead appearance/prominence. No fever/chills/drainage

## 2023-06-21 ENCOUNTER — INPATIENT (INPATIENT)
Facility: HOSPITAL | Age: 63
LOS: 3 days | Discharge: HOME | DRG: 690 | End: 2023-06-25
Attending: STUDENT IN AN ORGANIZED HEALTH CARE EDUCATION/TRAINING PROGRAM | Admitting: HOSPITALIST
Payer: COMMERCIAL

## 2023-06-21 VITALS
TEMPERATURE: 100 F | DIASTOLIC BLOOD PRESSURE: 98 MMHG | SYSTOLIC BLOOD PRESSURE: 166 MMHG | HEART RATE: 99 BPM | HEIGHT: 67 IN | OXYGEN SATURATION: 97 % | RESPIRATION RATE: 16 BRPM | WEIGHT: 179.9 LBS

## 2023-06-21 DIAGNOSIS — Z98.890 OTHER SPECIFIED POSTPROCEDURAL STATES: Chronic | ICD-10-CM

## 2023-06-21 DIAGNOSIS — Z90.710 ACQUIRED ABSENCE OF BOTH CERVIX AND UTERUS: Chronic | ICD-10-CM

## 2023-06-21 DIAGNOSIS — R10.9 UNSPECIFIED ABDOMINAL PAIN: ICD-10-CM

## 2023-06-21 LAB
ALBUMIN SERPL ELPH-MCNC: 4.8 G/DL — SIGNIFICANT CHANGE UP (ref 3.5–5.2)
ALP SERPL-CCNC: 87 U/L — SIGNIFICANT CHANGE UP (ref 30–115)
ALT FLD-CCNC: 19 U/L — SIGNIFICANT CHANGE UP (ref 0–41)
ANION GAP SERPL CALC-SCNC: 14 MMOL/L — SIGNIFICANT CHANGE UP (ref 7–14)
APPEARANCE UR: ABNORMAL
AST SERPL-CCNC: 17 U/L — SIGNIFICANT CHANGE UP (ref 0–41)
BACTERIA # UR AUTO: ABNORMAL
BASOPHILS # BLD AUTO: 0.04 K/UL — SIGNIFICANT CHANGE UP (ref 0–0.2)
BASOPHILS NFR BLD AUTO: 0.2 % — SIGNIFICANT CHANGE UP (ref 0–1)
BILIRUB DIRECT SERPL-MCNC: <0.2 MG/DL — SIGNIFICANT CHANGE UP (ref 0–0.3)
BILIRUB INDIRECT FLD-MCNC: >0.3 MG/DL — SIGNIFICANT CHANGE UP (ref 0.2–1.2)
BILIRUB SERPL-MCNC: 0.5 MG/DL — SIGNIFICANT CHANGE UP (ref 0.2–1.2)
BILIRUB UR-MCNC: NEGATIVE — SIGNIFICANT CHANGE UP
BLD GP AB SCN SERPL QL: SIGNIFICANT CHANGE UP
BUN SERPL-MCNC: 10 MG/DL — SIGNIFICANT CHANGE UP (ref 10–20)
CALCIUM SERPL-MCNC: 9.8 MG/DL — SIGNIFICANT CHANGE UP (ref 8.4–10.4)
CHLORIDE SERPL-SCNC: 98 MMOL/L — SIGNIFICANT CHANGE UP (ref 98–110)
CO2 SERPL-SCNC: 24 MMOL/L — SIGNIFICANT CHANGE UP (ref 17–32)
COLOR SPEC: YELLOW — SIGNIFICANT CHANGE UP
CREAT SERPL-MCNC: 0.8 MG/DL — SIGNIFICANT CHANGE UP (ref 0.7–1.5)
DIFF PNL FLD: ABNORMAL
EGFR: 83 ML/MIN/1.73M2 — SIGNIFICANT CHANGE UP
EOSINOPHIL # BLD AUTO: 0.02 K/UL — SIGNIFICANT CHANGE UP (ref 0–0.7)
EOSINOPHIL NFR BLD AUTO: 0.1 % — SIGNIFICANT CHANGE UP (ref 0–8)
EPI CELLS # UR: 8 /HPF — HIGH (ref 0–5)
GLUCOSE SERPL-MCNC: 120 MG/DL — HIGH (ref 70–99)
GLUCOSE UR QL: NEGATIVE — SIGNIFICANT CHANGE UP
HCT VFR BLD CALC: 42.7 % — SIGNIFICANT CHANGE UP (ref 37–47)
HGB BLD-MCNC: 14.1 G/DL — SIGNIFICANT CHANGE UP (ref 12–16)
HYALINE CASTS # UR AUTO: 37 /LPF — HIGH (ref 0–7)
IMM GRANULOCYTES NFR BLD AUTO: 0.5 % — HIGH (ref 0.1–0.3)
KETONES UR-MCNC: ABNORMAL
LACTATE SERPL-SCNC: 1.1 MMOL/L — SIGNIFICANT CHANGE UP (ref 0.7–2)
LEUKOCYTE ESTERASE UR-ACNC: ABNORMAL
LIDOCAIN IGE QN: 25 U/L — SIGNIFICANT CHANGE UP (ref 7–60)
LYMPHOCYTES # BLD AUTO: 14 % — LOW (ref 20.5–51.1)
LYMPHOCYTES # BLD AUTO: 2.33 K/UL — SIGNIFICANT CHANGE UP (ref 1.2–3.4)
MCHC RBC-ENTMCNC: 29.6 PG — SIGNIFICANT CHANGE UP (ref 27–31)
MCHC RBC-ENTMCNC: 33 G/DL — SIGNIFICANT CHANGE UP (ref 32–37)
MCV RBC AUTO: 89.7 FL — SIGNIFICANT CHANGE UP (ref 81–99)
MONOCYTES # BLD AUTO: 0.94 K/UL — HIGH (ref 0.1–0.6)
MONOCYTES NFR BLD AUTO: 5.7 % — SIGNIFICANT CHANGE UP (ref 1.7–9.3)
NEUTROPHILS # BLD AUTO: 13.21 K/UL — HIGH (ref 1.4–6.5)
NEUTROPHILS NFR BLD AUTO: 79.5 % — HIGH (ref 42.2–75.2)
NITRITE UR-MCNC: NEGATIVE — SIGNIFICANT CHANGE UP
NRBC # BLD: 0 /100 WBCS — SIGNIFICANT CHANGE UP (ref 0–0)
PH UR: 6 — SIGNIFICANT CHANGE UP (ref 5–8)
PLATELET # BLD AUTO: 280 K/UL — SIGNIFICANT CHANGE UP (ref 130–400)
PMV BLD: 10.9 FL — HIGH (ref 7.4–10.4)
POTASSIUM SERPL-MCNC: 4.6 MMOL/L — SIGNIFICANT CHANGE UP (ref 3.5–5)
POTASSIUM SERPL-SCNC: 4.6 MMOL/L — SIGNIFICANT CHANGE UP (ref 3.5–5)
PROT SERPL-MCNC: 7.3 G/DL — SIGNIFICANT CHANGE UP (ref 6–8)
PROT UR-MCNC: ABNORMAL
RBC # BLD: 4.76 M/UL — SIGNIFICANT CHANGE UP (ref 4.2–5.4)
RBC # FLD: 13.2 % — SIGNIFICANT CHANGE UP (ref 11.5–14.5)
RBC CASTS # UR COMP ASSIST: 8 /HPF — HIGH (ref 0–4)
SODIUM SERPL-SCNC: 136 MMOL/L — SIGNIFICANT CHANGE UP (ref 135–146)
SP GR SPEC: 1.03 — SIGNIFICANT CHANGE UP (ref 1.01–1.03)
UROBILINOGEN FLD QL: SIGNIFICANT CHANGE UP
WBC # BLD: 16.63 K/UL — HIGH (ref 4.8–10.8)
WBC # FLD AUTO: 16.63 K/UL — HIGH (ref 4.8–10.8)
WBC UR QL: 106 /HPF — HIGH (ref 0–5)

## 2023-06-21 PROCEDURE — 99285 EMERGENCY DEPT VISIT HI MDM: CPT

## 2023-06-21 PROCEDURE — 74177 CT ABD & PELVIS W/CONTRAST: CPT | Mod: 26,MA

## 2023-06-21 RX ORDER — MORPHINE SULFATE 50 MG/1
4 CAPSULE, EXTENDED RELEASE ORAL ONCE
Refills: 0 | Status: DISCONTINUED | OUTPATIENT
Start: 2023-06-21 | End: 2023-06-21

## 2023-06-21 RX ORDER — ONDANSETRON 8 MG/1
4 TABLET, FILM COATED ORAL ONCE
Refills: 0 | Status: COMPLETED | OUTPATIENT
Start: 2023-06-21 | End: 2023-06-21

## 2023-06-21 RX ORDER — SODIUM CHLORIDE 9 MG/ML
1000 INJECTION, SOLUTION INTRAVENOUS ONCE
Refills: 0 | Status: COMPLETED | OUTPATIENT
Start: 2023-06-21 | End: 2023-06-21

## 2023-06-21 RX ORDER — CEFTRIAXONE 500 MG/1
1000 INJECTION, POWDER, FOR SOLUTION INTRAMUSCULAR; INTRAVENOUS ONCE
Refills: 0 | Status: COMPLETED | OUTPATIENT
Start: 2023-06-21 | End: 2023-06-21

## 2023-06-21 RX ADMIN — CEFTRIAXONE 100 MILLIGRAM(S): 500 INJECTION, POWDER, FOR SOLUTION INTRAMUSCULAR; INTRAVENOUS at 21:37

## 2023-06-21 RX ADMIN — MORPHINE SULFATE 4 MILLIGRAM(S): 50 CAPSULE, EXTENDED RELEASE ORAL at 18:55

## 2023-06-21 RX ADMIN — SODIUM CHLORIDE 1000 MILLILITER(S): 9 INJECTION, SOLUTION INTRAVENOUS at 18:56

## 2023-06-21 RX ADMIN — ONDANSETRON 4 MILLIGRAM(S): 8 TABLET, FILM COATED ORAL at 18:53

## 2023-06-21 NOTE — ED PROVIDER NOTE - NS ED MD DISPO ISOLATION TYPES
Symptoms of lack of motivation, decreased concentration likely due to depressive symptoms verses ADHD  He does have a lot of current life stressors which are likely contributing  He does not feel so the Effexor was helping  Discussed the possibility of increasing Effexor versus trying a new medication given symptoms of fatigue and difficulty concentrating he would like to try Wellbutrin, will stop Effexor and start Wellbutrin  Discussed importance of lifestyle modifications including good sleep schedule, healthy eating and regular physical activity ideally done outdoors to improve mood symptoms  None

## 2023-06-21 NOTE — ED PROVIDER NOTE - DIFFERENTIAL DIAGNOSIS
Pancreatitis, hepatic failure, obstructive uropathy, diverticulitis, colitis, abscess, bowel obstruction, bowel perforation. Electrolyte abnormalities, OUSMANE, and GI bleeding. Differential Diagnosis

## 2023-06-21 NOTE — ED PROVIDER NOTE - CARE PLAN
1 Principal Discharge DX:	Abdominal pain  Secondary Diagnosis:	Acute UTI  Secondary Diagnosis:	Fever

## 2023-06-21 NOTE — ED PROVIDER NOTE - OBJECTIVE STATEMENT
62 year old female, no past medical history, who presents with abd pain. patient with lower abd pain that began this morning with associated nausea/vomiting that progressively worsened prompting ed eval. denies f/c, chest pain, shortness of breath, back pain, urinary symptoms, bowel changes, vaginal bleeding/discharge. no hx abd surgeries.

## 2023-06-21 NOTE — ED PROVIDER NOTE - PHYSICAL EXAMINATION
CONSTITUTIONAL: Well-developed; well-nourished; in no acute distress, nontoxic appearing  SKIN: skin exam is warm and dry  ENT: MMM  CARD: S1, S2 normal, no murmur  RESP: No wheezes, rales or rhonchi. Good air movement bilaterally  ABD: soft; non-distended; +lower abd TTP   EXT: Normal ROM.   NEURO: awake, alert, following commands, oriented, grossly unremarkable. No Focal deficits. GCS 15.   PSYCH: Cooperative, appropriate.

## 2023-06-21 NOTE — ED PROVIDER NOTE - CLINICAL SUMMARY MEDICAL DECISION MAKING FREE TEXT BOX
Laboratory results reviewed and discussed with patient. CBC shows WBC count of 16,  Hb/Hct and plateelet count are WNL. BMP shows electrolytes WNL and no OUSMANE.   +UTI.   Patient remained hemodynamically stable during the course of ED stay. Discussed with patient about the results of the diagnostic studies. Discussed with admitting physician and MAR, patient is admitted to Medicine for further evaluation and care.

## 2023-06-21 NOTE — ED ADULT NURSE NOTE - NSFALLUNIVINTERV_ED_ALL_ED
Bed/Stretcher in lowest position, wheels locked, appropriate side rails in place/Call bell, personal items and telephone in reach/Instruct patient to call for assistance before getting out of bed/chair/stretcher/Non-slip footwear applied when patient is off stretcher/[place] to call system/Physically safe environment - no spills, clutter or unnecessary equipment/Purposeful proactive rounding/Room/bathroom lighting operational, light cord in reach

## 2023-06-22 DIAGNOSIS — R10.9 UNSPECIFIED ABDOMINAL PAIN: ICD-10-CM

## 2023-06-22 LAB
ALBUMIN SERPL ELPH-MCNC: 3.8 G/DL — SIGNIFICANT CHANGE UP (ref 3.5–5.2)
ALP SERPL-CCNC: 71 U/L — SIGNIFICANT CHANGE UP (ref 30–115)
ALT FLD-CCNC: 15 U/L — SIGNIFICANT CHANGE UP (ref 0–41)
ANION GAP SERPL CALC-SCNC: 16 MMOL/L — HIGH (ref 7–14)
AST SERPL-CCNC: 12 U/L — SIGNIFICANT CHANGE UP (ref 0–41)
BILIRUB SERPL-MCNC: 0.4 MG/DL — SIGNIFICANT CHANGE UP (ref 0.2–1.2)
BUN SERPL-MCNC: 8 MG/DL — LOW (ref 10–20)
CALCIUM SERPL-MCNC: 8.7 MG/DL — SIGNIFICANT CHANGE UP (ref 8.4–10.5)
CHLORIDE SERPL-SCNC: 100 MMOL/L — SIGNIFICANT CHANGE UP (ref 98–110)
CO2 SERPL-SCNC: 24 MMOL/L — SIGNIFICANT CHANGE UP (ref 17–32)
CREAT SERPL-MCNC: 0.9 MG/DL — SIGNIFICANT CHANGE UP (ref 0.7–1.5)
EGFR: 72 ML/MIN/1.73M2 — SIGNIFICANT CHANGE UP
FLUAV AG NPH QL: SIGNIFICANT CHANGE UP
FLUBV AG NPH QL: SIGNIFICANT CHANGE UP
GLUCOSE SERPL-MCNC: 126 MG/DL — HIGH (ref 70–99)
HCT VFR BLD CALC: 37 % — SIGNIFICANT CHANGE UP (ref 37–47)
HGB BLD-MCNC: 12.3 G/DL — SIGNIFICANT CHANGE UP (ref 12–16)
MAGNESIUM SERPL-MCNC: 1.9 MG/DL — SIGNIFICANT CHANGE UP (ref 1.8–2.4)
MCHC RBC-ENTMCNC: 30.1 PG — SIGNIFICANT CHANGE UP (ref 27–31)
MCHC RBC-ENTMCNC: 33.2 G/DL — SIGNIFICANT CHANGE UP (ref 32–37)
MCV RBC AUTO: 90.7 FL — SIGNIFICANT CHANGE UP (ref 81–99)
NRBC # BLD: 0 /100 WBCS — SIGNIFICANT CHANGE UP (ref 0–0)
PLATELET # BLD AUTO: 242 K/UL — SIGNIFICANT CHANGE UP (ref 130–400)
PMV BLD: 11.2 FL — HIGH (ref 7.4–10.4)
POTASSIUM SERPL-MCNC: 3.9 MMOL/L — SIGNIFICANT CHANGE UP (ref 3.5–5)
POTASSIUM SERPL-SCNC: 3.9 MMOL/L — SIGNIFICANT CHANGE UP (ref 3.5–5)
PROT SERPL-MCNC: 5.9 G/DL — LOW (ref 6–8)
RBC # BLD: 4.08 M/UL — LOW (ref 4.2–5.4)
RBC # FLD: 13.5 % — SIGNIFICANT CHANGE UP (ref 11.5–14.5)
RSV RNA NPH QL NAA+NON-PROBE: SIGNIFICANT CHANGE UP
SARS-COV-2 RNA SPEC QL NAA+PROBE: SIGNIFICANT CHANGE UP
SODIUM SERPL-SCNC: 140 MMOL/L — SIGNIFICANT CHANGE UP (ref 135–146)
WBC # BLD: 14.42 K/UL — HIGH (ref 4.8–10.8)
WBC # FLD AUTO: 14.42 K/UL — HIGH (ref 4.8–10.8)

## 2023-06-22 PROCEDURE — 80053 COMPREHEN METABOLIC PANEL: CPT

## 2023-06-22 PROCEDURE — 0241U: CPT

## 2023-06-22 PROCEDURE — 84145 PROCALCITONIN (PCT): CPT

## 2023-06-22 PROCEDURE — 82274 ASSAY TEST FOR BLOOD FECAL: CPT

## 2023-06-22 PROCEDURE — 36415 COLL VENOUS BLD VENIPUNCTURE: CPT

## 2023-06-22 PROCEDURE — 83735 ASSAY OF MAGNESIUM: CPT

## 2023-06-22 PROCEDURE — 87040 BLOOD CULTURE FOR BACTERIA: CPT

## 2023-06-22 PROCEDURE — 86901 BLOOD TYPING SEROLOGIC RH(D): CPT

## 2023-06-22 PROCEDURE — 85027 COMPLETE CBC AUTOMATED: CPT

## 2023-06-22 PROCEDURE — 82550 ASSAY OF CK (CPK): CPT

## 2023-06-22 PROCEDURE — G0378: CPT

## 2023-06-22 PROCEDURE — 86850 RBC ANTIBODY SCREEN: CPT

## 2023-06-22 PROCEDURE — 76705 ECHO EXAM OF ABDOMEN: CPT | Mod: 26

## 2023-06-22 PROCEDURE — 86803 HEPATITIS C AB TEST: CPT

## 2023-06-22 PROCEDURE — 76705 ECHO EXAM OF ABDOMEN: CPT

## 2023-06-22 PROCEDURE — 99223 1ST HOSP IP/OBS HIGH 75: CPT

## 2023-06-22 PROCEDURE — 86900 BLOOD TYPING SEROLOGIC ABO: CPT

## 2023-06-22 PROCEDURE — 82270 OCCULT BLOOD FECES: CPT

## 2023-06-22 PROCEDURE — 86140 C-REACTIVE PROTEIN: CPT

## 2023-06-22 PROCEDURE — 80048 BASIC METABOLIC PNL TOTAL CA: CPT

## 2023-06-22 PROCEDURE — 85025 COMPLETE CBC W/AUTO DIFF WBC: CPT

## 2023-06-22 PROCEDURE — 76830 TRANSVAGINAL US NON-OB: CPT | Mod: 26

## 2023-06-22 PROCEDURE — 85379 FIBRIN DEGRADATION QUANT: CPT

## 2023-06-22 PROCEDURE — 85652 RBC SED RATE AUTOMATED: CPT

## 2023-06-22 RX ORDER — AMLODIPINE BESYLATE 2.5 MG/1
5 TABLET ORAL DAILY
Refills: 0 | Status: DISCONTINUED | OUTPATIENT
Start: 2023-06-22 | End: 2023-06-25

## 2023-06-22 RX ORDER — TRAMADOL HYDROCHLORIDE 50 MG/1
25 TABLET ORAL DAILY
Refills: 0 | Status: DISCONTINUED | OUTPATIENT
Start: 2023-06-22 | End: 2023-06-22

## 2023-06-22 RX ORDER — LEVOTHYROXINE SODIUM 125 MCG
100 TABLET ORAL DAILY
Refills: 0 | Status: DISCONTINUED | OUTPATIENT
Start: 2023-06-22 | End: 2023-06-25

## 2023-06-22 RX ORDER — LANOLIN ALCOHOL/MO/W.PET/CERES
3 CREAM (GRAM) TOPICAL AT BEDTIME
Refills: 0 | Status: DISCONTINUED | OUTPATIENT
Start: 2023-06-22 | End: 2023-06-25

## 2023-06-22 RX ORDER — LISINOPRIL 2.5 MG/1
10 TABLET ORAL DAILY
Refills: 0 | Status: DISCONTINUED | OUTPATIENT
Start: 2023-06-22 | End: 2023-06-25

## 2023-06-22 RX ORDER — SODIUM CHLORIDE 9 MG/ML
1000 INJECTION, SOLUTION INTRAVENOUS
Refills: 0 | Status: DISCONTINUED | OUTPATIENT
Start: 2023-06-22 | End: 2023-06-23

## 2023-06-22 RX ORDER — METRONIDAZOLE 500 MG
TABLET ORAL
Refills: 0 | Status: DISCONTINUED | OUTPATIENT
Start: 2023-06-22 | End: 2023-06-22

## 2023-06-22 RX ORDER — ACETAMINOPHEN 500 MG
650 TABLET ORAL EVERY 6 HOURS
Refills: 0 | Status: DISCONTINUED | OUTPATIENT
Start: 2023-06-22 | End: 2023-06-25

## 2023-06-22 RX ORDER — TRAMADOL HYDROCHLORIDE 50 MG/1
25 TABLET ORAL EVERY 8 HOURS
Refills: 0 | Status: DISCONTINUED | OUTPATIENT
Start: 2023-06-22 | End: 2023-06-25

## 2023-06-22 RX ORDER — METRONIDAZOLE 500 MG
500 TABLET ORAL ONCE
Refills: 0 | Status: COMPLETED | OUTPATIENT
Start: 2023-06-22 | End: 2023-06-22

## 2023-06-22 RX ORDER — PANTOPRAZOLE SODIUM 20 MG/1
1 TABLET, DELAYED RELEASE ORAL
Refills: 0 | DISCHARGE

## 2023-06-22 RX ORDER — CEFTRIAXONE 500 MG/1
1000 INJECTION, POWDER, FOR SOLUTION INTRAMUSCULAR; INTRAVENOUS EVERY 24 HOURS
Refills: 0 | Status: COMPLETED | OUTPATIENT
Start: 2023-06-22 | End: 2023-06-24

## 2023-06-22 RX ORDER — KETOROLAC TROMETHAMINE 30 MG/ML
15 SYRINGE (ML) INJECTION ONCE
Refills: 0 | Status: DISCONTINUED | OUTPATIENT
Start: 2023-06-22 | End: 2023-06-22

## 2023-06-22 RX ORDER — ONDANSETRON 8 MG/1
4 TABLET, FILM COATED ORAL EVERY 8 HOURS
Refills: 0 | Status: DISCONTINUED | OUTPATIENT
Start: 2023-06-22 | End: 2023-06-25

## 2023-06-22 RX ORDER — METRONIDAZOLE 500 MG
500 TABLET ORAL EVERY 8 HOURS
Refills: 0 | Status: DISCONTINUED | OUTPATIENT
Start: 2023-06-22 | End: 2023-06-22

## 2023-06-22 RX ADMIN — Medication 100 MICROGRAM(S): at 07:04

## 2023-06-22 RX ADMIN — AMLODIPINE BESYLATE 5 MILLIGRAM(S): 2.5 TABLET ORAL at 07:05

## 2023-06-22 RX ADMIN — Medication 15 MILLIGRAM(S): at 01:31

## 2023-06-22 RX ADMIN — TRAMADOL HYDROCHLORIDE 25 MILLIGRAM(S): 50 TABLET ORAL at 17:31

## 2023-06-22 RX ADMIN — Medication 650 MILLIGRAM(S): at 17:34

## 2023-06-22 RX ADMIN — LISINOPRIL 10 MILLIGRAM(S): 2.5 TABLET ORAL at 07:04

## 2023-06-22 RX ADMIN — SODIUM CHLORIDE 75 MILLILITER(S): 9 INJECTION, SOLUTION INTRAVENOUS at 21:21

## 2023-06-22 RX ADMIN — Medication 100 MILLIGRAM(S): at 07:04

## 2023-06-22 RX ADMIN — TRAMADOL HYDROCHLORIDE 25 MILLIGRAM(S): 50 TABLET ORAL at 10:02

## 2023-06-22 RX ADMIN — CEFTRIAXONE 100 MILLIGRAM(S): 500 INJECTION, POWDER, FOR SOLUTION INTRAMUSCULAR; INTRAVENOUS at 05:42

## 2023-06-22 RX ADMIN — TRAMADOL HYDROCHLORIDE 25 MILLIGRAM(S): 50 TABLET ORAL at 11:03

## 2023-06-22 RX ADMIN — SODIUM CHLORIDE 75 MILLILITER(S): 9 INJECTION, SOLUTION INTRAVENOUS at 07:03

## 2023-06-22 NOTE — H&P ADULT - NSHPLABSRESULTS_GEN_ALL_CORE
Complete Blood Count + Automated Diff (06.21.23 @ 19:04)    WBC Count: 16.63 K/uL    RBC Count: 4.76 M/uL    Hemoglobin: 14.1 g/dL    Hematocrit: 42.7 %    Mean Cell Volume: 89.7 fL    Mean Cell Hemoglobin: 29.6 pg    Mean Cell Hemoglobin Conc: 33.0 g/dL    Red Cell Distrib Width: 13.2 %    Platelet Count - Automated: 280 K/uL    MPV: 10.9 fL    Auto Neutrophil #: 13.21 K/uL    Auto Lymphocyte #: 2.33 K/uL    Auto Monocyte #: 0.94 K/uL    Auto Eosinophil #: 0.02 K/uL    Auto Basophil #: 0.04 K/uL    Auto Neutrophil %: 79.5: Differential percentages must be correlated with absolute numbers for  clinical significance. %    Auto Lymphocyte %: 14.0 %    Auto Monocyte %: 5.7 %    Auto Eosinophil %: 0.1 %    Auto Basophil %: 0.2 %    Auto Immature Granulocyte %: 0.5: (Includes meta, myelo and promyelocytes). Mild elevations in immature  granulocytes may be seen with many inflammatory processes and pregnancy;  clinical correlation suggested. %    Nucleated RBC: 0 /100 WBCs    Comprehensive Metabolic Panel (04.04.23 @ 12:00)    Sodium, Serum: 143 mmol/L    Potassium, Serum: 4.2 mmol/L    Chloride, Serum: 103 mmol/L    Carbon Dioxide, Serum: 24 mmol/L    Anion Gap, Serum: 16 mmol/L    Blood Urea Nitrogen, Serum: 20 mg/dL    Creatinine, Serum: 0.8 mg/dL    Glucose, Serum: 77 mg/dL    Calcium, Total Serum: 9.5 mg/dL    Protein Total, Serum: 7.1 g/dL    Albumin, Serum: 5.1 g/dL    Bilirubin Total, Serum: 0.4 mg/dL    Alkaline Phosphatase, Serum: 80 U/L    Aspartate Aminotransferase (AST/SGOT): 17 U/L    Alanine Aminotransferase (ALT/SGPT): 17 U/L    eGFR: 83: The estimated glomerular filtration rate (eGFR) is calculated using the  2021 CKD-EPI creatinine equation, which does not have a coefficient for  race and is validated in individuals 18 years of age and older (N Engl J  Med 2021; 385:9751-6908). Creatinine-based eGFR may be inaccurate in  various situations including but not limited to extremes of muscle mass,  altered dietary protein intake, or medications that affect renal tubular  creatinine secretion. mL/min/1.73m2    Lipase (06.21.23 @ 19:04)    Lipase: 25 U/L    Lactate, Blood (06.21.23 @ 19:04)    Lactate, Blood: 1.1 mmol/L    < from: CT Abdomen and Pelvis w/ IV Cont (06.21.23 @ 20:16) >    IMPRESSION:  Poorly visualized uterus with pelvic clips and possible trace fluid   (4/125). Correlate with clinical history.      Left iliac venous stents.    No bowel obstruction or ascites.      < end of copied text >

## 2023-06-22 NOTE — H&P ADULT - NSHPPHYSICALEXAM_GEN_ALL_CORE
PHYSICAL EXAM:  GENERAL: NAD, well-groomed, well-developed  HEAD:  Atraumatic, Normocephalic  NECK: Supple, No JVD, Normal thyroid  HEART: Regular rate and rhythm; No murmurs, rubs, or gallops  RESPIRATORY: CTA B/L, No W/R/R  ABDOMEN: Soft, Nondistended, TENDER IN LOWER QUADRANTS R>L; Bowel sounds present  NEUROLOGY: A&Ox3, nonfocal, moving all extremities  EXTREMITIES:  2+ Peripheral Pulses, No clubbing, cyanosis, or edema  SKIN: warm, dry, normal color, no rash or abnormal lesions

## 2023-06-22 NOTE — H&P ADULT - ATTENDING COMMENTS
***My note supersedes any discrepancies that may be above in the resident's note***    62 year old female pmh hypertension and hypothyroid presenting for one day of right lower quadrant abdominal pain associated with dark yellow vomiting.    Gen- middle-age F, mildly distressed but not toxic appearing  Eyes- anicteric sclera, non injected conjunctiva, EOMI  ENT- hearing grossly intact, oropharynx clear, MMM  Chest- symmetrical chest rise, no accessory muscle use  Cardiac- nontachycardic  Abdominal- non distended, ttp in lower left quadrant  Ext- no clubbing or cyanosis  Skin- warm, dry, intact  Neuro- AOx3, normal mentation, CN 2-12 grossly itnact     #Abdominal Pain  #Nausea/Vomiting  - unsure etiology  - currently HDS, afebrile  - WBC 16k  - UA grossly positive  - CT abdomen pelvis no acute pathology  - RUQ, notable for steatosis but no acute pathology  - transvaginal US: Surgically absent uterus and ovaries. No adnexal or pelvic mass is seen.  - c/w ceftriaxone; stop flagyl as low concern for intrabdominal pathology  - Gentle hydration LR @75cc/hour  - Zofran PRN   - advance diet to regular diet  - follow up on Ucx  - follow up on blood cx    #HTN  - c/w Amlodipine 5mg  - Lisinopril 10mg     #Hypothyroid  - c/w levothyroxine 100mcg      #Misc  - DVT Prophylaxis: Lovenox   - GI Prophylaxis: Protonix  - Diet: Reg diet  - Activity: As tolerated  - IV Fluids: LR @ 75cc\hr   - Code Status: Full code    #Progress Note Handoff  Pending (specify):  follow up on blood and urine cultures, clinical improvement  Family discussion: updated at bedside  Disposition: likely home in 24hrs

## 2023-06-22 NOTE — H&P ADULT - HISTORY OF PRESENT ILLNESS
62 year old female pmh hypertension and hypothyroid presenting for one day of right lower quadrant abdominal pain associated with dark yellow vomiting. Patient says that last night around midnight she awoke to sharp stabbing abdominal pain.  62 year old female pmh hypertension and hypothyroid presenting for one day of right lower quadrant abdominal pain associated with dark yellow vomiting. Patient says that last night around midnight she awoke to sharp stabbing abdominal pain. She then began vomiting dark yellow vomitus multiple times throughout the day. She is unable to keep anything down including water and tea. Patient states she has had something like this before many years ago when she was in [facility], they did an endoscopy but doesn't remember what they found. Patient denies diarrhea or constipation, last bowel movement was yesterday morning.      Vital Signs Last 24 Hrs  T(F): 100.1 (22 Jun 2023 00:28), Max: 100.1 (22 Jun 2023 00:28)  HR: 85 (22 Jun 2023 00:28) (85 - 99)  BP: 136/75 (22 Jun 2023 00:28) (136/75 - 166/98)  BP(mean): --  RR: 17 (22 Jun 2023 00:28) (16 - 17)  SpO2: 95% (22 Jun 2023 00:28) (95% - 97%)  Patient On (Oxygen Delivery Method): room air    CT abdomen and pelvis did not show any acute intrabdominal pathology, just surgical clips from prior hysterectomy and possible trace fluid.     Labs are significant for a wbc count of 16k.

## 2023-06-22 NOTE — PROGRESS NOTE ADULT - SUBJECTIVE AND OBJECTIVE BOX
24H events:    Patient is a 62y old Female who presents with a chief complaint of Abdominal Pain (2023 03:45)     Today is hospital day 1. Patient still feels dull pain in RLQ, less than when in ED. She endorses urinary hesitancy. She denies fever.     PAST MEDICAL & SURGICAL HISTORY  GERD (gastroesophageal reflux disease)    Hypothyroidism    HTN (hypertension)    H/O varicose veins    H/O: pneumonia  s/p hysterectomy    H/O left breast biopsy    S/P hysterectomy    S/P left knee arthroscopy    H/O ligation of vein  left leg      SOCIAL HISTORY:  Social History:      ALLERGIES:  No Known Allergies    MEDICATIONS:  STANDING MEDICATIONS  amLODIPine   Tablet 5 milliGRAM(s) Oral daily  cefTRIAXone   IVPB 1000 milliGRAM(s) IV Intermittent every 24 hours  lactated ringers. 1000 milliLiter(s) IV Continuous <Continuous>  levothyroxine 100 MICROGram(s) Oral daily  lisinopril 10 milliGRAM(s) Oral daily    PRN MEDICATIONS  acetaminophen     Tablet .. 650 milliGRAM(s) Oral every 6 hours PRN  aluminum hydroxide/magnesium hydroxide/simethicone Suspension 30 milliLiter(s) Oral every 4 hours PRN  melatonin 3 milliGRAM(s) Oral at bedtime PRN  ondansetron Injectable 4 milliGRAM(s) IV Push every 8 hours PRN  traMADol 25 milliGRAM(s) Oral daily PRN    VITALS:   T(F): 103.5  HR: 76  BP: 119/70  RR: 17  SpO2: 98%    PHYSICAL EXAM:  GENERAL: in pain  NERVOUS SYSTEM:  Alert & Oriented X3  CHEST/LUNG: Clear to auscultation bilaterally; No rales, rhonchi, wheezing, or rubs  HEART: Regular rate and rhythm; No murmurs, rubs, or gallops  ABDOMEN: Tender to palpation in LLQ and RLQ, nondistended   EXTREMITIES: No clubbing, cyanosis, or edema    LABS:                        14.1   16.63 )-----------( 280      ( 2023 19:04 )             42.7     06-21    136  |  98  |  10  ----------------------------<  120<H>  4.6   |  24  |  0.8    Ca    9.8      2023 19:04    TPro  7.3  /  Alb  4.8  /  TBili  0.5  /  DBili  <0.2  /  AST  17  /  ALT  19  /  AlkPhos  87        Urinalysis Basic - ( 2023 19:50 )    Color: Yellow / Appearance: Slightly Turbid / S.029 / pH: x  Gluc: x / Ketone: Small  / Bili: Negative / Urobili: <2 mg/dL   Blood: x / Protein: 30 mg/dL / Nitrite: Negative   Leuk Esterase: Large / RBC: 8 /HPF /  /HPF   Sq Epi: x / Non Sq Epi: x / Bacteria: Few        Lactate, Blood: 1.1 mmol/L (23 @ 19:04)          RADIOLOGY:    US Abdomen, RUQ (): Hepatic steatosis, otherwise unremarkable right quadrant abdominal ultrasound.    No cholelithiasis or sonographic evidence of acute cholecystitis.    US Transvaginal (): Surgically absent uterus and ovaries. No adnexal or pelvic mass is seen.    Nonspecific trace pelvic free fluid.    CT Abdomen and pelvis (): Poorly visualized uterus with pelvic clips and possible trace fluid (4/125). Correlate with clinical history.      Left iliac venous stents.    No bowel obstruction or ascites.

## 2023-06-22 NOTE — PROGRESS NOTE ADULT - ASSESSMENT
62-year old patient with PMH of hypertension and hypothyroidism presenting for one day of RLQ abdominal pain associated with dark yellow vomiting.     #Abdominal Pain  #Nausea/Vomiting  - 1 day of nausea vomiting -> no vomiting in ED  - RLQ pain  - CT abdomen, RUQ US negative  - WBC count 16.63, 62-year old patient with PMH of hypertension and hypothyroidism presenting for one day of RLQ abdominal pain associated with dark yellow vomiting, possibly in the setting of UTI.    #Abdominal Pain  #Nausea/Vomiting  - 1 day of nausea vomiting -> no vomiting in ED  - RLQ pain  - CT abdomen, RUQ US negative  - WBC count 16.63  - 103.5 fever at 1pm  - f/u blood cx and procal  - pending UCx results  - c/w ceftriaxone  - flagyl stopped  - advanced to dash diet    #HTN  - c/w amlodipine 5mg  - Lisinopril 10mg    #Hypothryoid  - c/w levothyroxine 100mcg    #Misc  - DVT Prophylaxis: Lovenox   - GI Prophylaxis: Protonix  - Diet: Clear liquid  - Activity: As tolerated  - IV Fluids: LR @ 75cc\hr   - Code Status: Full code     62-year old patient with PMH of hypertension and hypothyroidism presenting for one day of RLQ abdominal pain associated with dark yellow vomiting, possibly in the setting of UTI.    #Abdominal Pain  #Nausea/Vomiting  - 1 day of nausea vomiting -> no vomiting in ED  - RLQ pain  - CT abdomen, RUQ US negative  - WBC count 16.63  - 103.5 fever at 1pm  - tylenol and cold compress given  - f/u blood cx and procal  - pending UCx results  - c/w ceftriaxone  - flagyl stopped  - advanced to dash diet    #HTN  - c/w amlodipine 5mg  - Lisinopril 10mg    #Hypothryoid  - c/w levothyroxine 100mcg    #Misc  - DVT Prophylaxis: Lovenox   - GI Prophylaxis: Protonix  - Diet: Clear liquid  - Activity: As tolerated  - IV Fluids: LR @ 75cc\hr   - Code Status: Full code

## 2023-06-22 NOTE — PATIENT PROFILE ADULT - FALL HARM RISK - HARM RISK INTERVENTIONS
Assistance OOB with selected safe patient handling equipment/Communicate Risk of Fall with Harm to all staff/Reinforce activity limits and safety measures with patient and family/Tailored Fall Risk Interventions/Use of alarms - bed, chair and/or voice tab/Visual Cue: Yellow wristband and red socks/Bed in lowest position, wheels locked, appropriate side rails in place/Call bell, personal items and telephone in reach/Instruct patient to call for assistance before getting out of bed or chair/Non-slip footwear when patient is out of bed/[place] to call system/Physically safe environment - no spills, clutter or unnecessary equipment/Purposeful Proactive Rounding/Room/bathroom lighting operational, light cord in reach

## 2023-06-22 NOTE — H&P ADULT - ASSESSMENT
62 year old female pmh hypertension and hypothyroid presenting for one day of right lower quadrant abdominal pain associated with dark yellow vomiting.    #Abdominal Pain  #Nausea/Vomiting  - 1 day of nausea vomiting-> No vomiting in ED   - RLQ pain   - CT abdomen pelvis no acute pathology    Plan:  - Gentle hydration LR @75cc/hour  - Zofran PRN   - Clear liquid diet   - Abdominal ultrasound pending     #HTN  - c/w Amlodipine 5mg  - Lisinopril 10mg     #Hypothyroid  - c/w levothyroxine 100mcg      #Misc  - DVT Prophylaxis: Lovenox   - GI Prophylaxis: Protonix  - Diet: Clear liquid  - Activity: As tolerated  - IV Fluids: LR @ 75cc\hr   - Code Status: Full code    Dispo: Acute likely dc in 24-48 hours  62 year old female pmh hypertension and hypothyroid presenting for one day of right lower quadrant abdominal pain associated with dark yellow vomiting.    #Abdominal Pain  #Nausea/Vomiting  - 1 day of nausea vomiting-> No vomiting in ED   - RLQ pain   - CT abdomen pelvis no acute pathology  - WBC count 16.7, low grade fever     Plan:  - c/w ceftriaxone, add flagyl   - Gentle hydration LR @75cc/hour  - Zofran PRN   - Clear liquid diet   - RUQ ultrasound pending   - Repeat UA     #HTN  - c/w Amlodipine 5mg  - Lisinopril 10mg     #Hypothyroid  - c/w levothyroxine 100mcg      #Misc  - DVT Prophylaxis: Lovenox   - GI Prophylaxis: Protonix  - Diet: Clear liquid  - Activity: As tolerated  - IV Fluids: LR @ 75cc\hr   - Code Status: Full code    Dispo: Acute likely dc in 24-48 hours

## 2023-06-22 NOTE — PATIENT PROFILE ADULT - FALL HARM RISK - TYPE OF ASSESSMENT
Well , 6 Years Old  Well-child exams are recommended visits with a health care provider to track your child's growth and development at certain ages. This sheet tells you what to expect during this visit.  Recommended immunizations    · Tetanus and diphtheria toxoids and acellular pertussis (Tdap) vaccine. Children 7 years and older who are not fully immunized with diphtheria and tetanus toxoids and acellular pertussis (DTaP) vaccine:  ? Should receive 1 dose of Tdap as a catch-up vaccine. It does not matter how long ago the last dose of tetanus and diphtheria toxoid-containing vaccine was given.  ? Should be given tetanus diphtheria (Td) vaccine if more catch-up doses are needed after the 1 Tdap dose.  · Your child may get doses of the following vaccines if needed to catch up on missed doses:  ? Hepatitis B vaccine.  ? Inactivated poliovirus vaccine.  ? Measles, mumps, and rubella (MMR) vaccine.  ? Varicella vaccine.  · Your child may get doses of the following vaccines if he or she has certain high-risk conditions:  ? Pneumococcal conjugate (PCV13) vaccine.  ? Pneumococcal polysaccharide (PPSV23) vaccine.  · Influenza vaccine (flu shot). Starting at age 6 months, your child should be given the flu shot every year. Children between the ages of 6 months and 8 years who get the flu shot for the first time should get a second dose at least 4 weeks after the first dose. After that, only a single yearly (annual) dose is recommended.  · Hepatitis A vaccine. Children who did not receive the vaccine before 2 years of age should be given the vaccine only if they are at risk for infection, or if hepatitis A protection is desired.  · Meningococcal conjugate vaccine. Children who have certain high-risk conditions, are present during an outbreak, or are traveling to a country with a high rate of meningitis should be given this vaccine.  Testing  Vision  · Have your child's vision checked every 2 years, as long as he  or she does not have symptoms of vision problems. Finding and treating eye problems early is important for your child's development and readiness for school.  · If an eye problem is found, your child may need to have his or her vision checked every year (instead of every 2 years). Your child may also:  ? Be prescribed glasses.  ? Have more tests done.  ? Need to visit an eye specialist.  Other tests  · Talk with your child's health care provider about the need for certain screenings. Depending on your child's risk factors, your child's health care provider may screen for:  ? Growth (developmental) problems.  ? Low red blood cell count (anemia).  ? Lead poisoning.  ? Tuberculosis (TB).  ? High cholesterol.  ? High blood sugar (glucose).  · Your child's health care provider will measure your child's BMI (body mass index) to screen for obesity.  · Your child should have his or her blood pressure checked at least once a year.  General instructions  Parenting tips    · Recognize your child's desire for privacy and independence. When appropriate, give your child a chance to solve problems by himself or herself. Encourage your child to ask for help when he or she needs it.  · Talk with your child's  on a regular basis to see how your child is performing in school.  · Regularly ask your child about how things are going in school and with friends. Acknowledge your child’s worries and discuss what he or she can do to decrease them.  · Talk with your child about safety, including street, bike, water, playground, and sports safety.  · Encourage daily physical activity. Take walks or go on bike rides with your child. Aim for 1 hour of physical activity for your child every day.  · Give your child chores to do around the house. Make sure your child understands that you expect the chores to be done.  · Set clear behavioral boundaries and limits. Discuss consequences of good and bad behavior. Praise and reward  positive behaviors, improvements, and accomplishments.  · Correct or discipline your child in private. Be consistent and fair with discipline.  · Do not hit your child or allow your child to hit others.  · Talk with your health care provider if you think your child is hyperactive, has an abnormally short attention span, or is very forgetful.  · Sexual curiosity is common. Answer questions about sexuality in clear and correct terms.  Oral health  · Your child will continue to lose his or her baby teeth. Permanent teeth will also continue to come in, such as the first back teeth (first molars) and front teeth (incisors).  · Continue to monitor your child's toothbrushing and encourage regular flossing. Make sure your child is brushing twice a day (in the morning and before bed) and using fluoride toothpaste.  · Schedule regular dental visits for your child. Ask your child's dentist if your child needs:  ? Sealants on his or her permanent teeth.  ? Treatment to correct his or her bite or to straighten his or her teeth.  · Give fluoride supplements as told by your child's health care provider.  Sleep  · Children at this age need 9-12 hours of sleep a day. Make sure your child gets enough sleep. Lack of sleep can affect your child’s participation in daily activities.  · Continue to stick to bedtime routines. Reading every night before bedtime may help your child relax.  · Try not to let your child watch TV before bedtime.  Elimination  · Nighttime bed-wetting may still be normal, especially for boys or if there is a family history of bed-wetting.  · It is best not to punish your child for bed-wetting.  · If your child is wetting the bed during both daytime and nighttime, contact your health care provider.  What's next?  Your next visit will take place when your child is 7 years old.  Summary  · Discuss the need for immunizations and screenings with your child's health care provider.  · Your child will continue to lose his  or her baby teeth. Permanent teeth will also continue to come in, such as the first back teeth (first molars) and front teeth (incisors). Make sure your child brushes two times a day using fluoride toothpaste.  · Make sure your child gets enough sleep. Lack of sleep can affect your child’s participation in daily activities.  · Encourage daily physical activity. Take walks or go on bike outings with your child. Aim for 1 hour of physical activity for your child every day.  · Talk with your health care provider if you think your child is hyperactive, has an abnormally short attention span, or is very forgetful.  This information is not intended to replace advice given to you by your health care provider. Make sure you discuss any questions you have with your health care provider.  Document Released: 01/07/2008 Document Revised: 07/27/2018 Document Reviewed: 07/27/2018  Elsevier Interactive Patient Education © 2019 [employer]    Cuidados preventivos del jennifer: 6 años  [facility] , 6 Years Old  Introducción  Los exámenes de control del jennifer son visitas recomendadas a un médico para llevar un registro del crecimiento y desarrollo del jennifer a ciertas edades. Esta hoja le suri información sobre qué esperar thompson esta visita.  Vacunas recomendadas    · Vacuna contra la difteria, el tétanos y la tos ferina acelular [difteria, tétanos, tos ferina (Tdap)]. A partir de los 7 años, los niños que no recibieron todas las vacunas contra la difteria, el tétanos y la tos ferina acelular (DTaP):  ? Deben recibir 1 dosis de la vacuna Tdap de refuerzo. No importa cuánto tiempo atrás haya sido aplicada la última dosis de la vacuna contra el tétanos y la difteria.  ? Deben recibir la vacuna contra el tétanos y la difteria (Td) si se necesitan más dosis de refuerzo después de la primera dosis de la vacuna Tdap.  · El jennifer puede recibir dosis de las siguientes vacunas, si es necesario, para ponerse al día con las dosis  omitidas:  ? Vacuna contra la hepatitis B.  ? Vacuna antipoliomielítica inactivada.  ? Vacuna contra el sarampión, rubéola y paperas (SRP).  ? Vacuna contra la varicela.  · El jennifer puede recibir dosis de las siguientes vacunas si tiene ciertas afecciones de alto riesgo:  ? Vacuna antineumocócica conjugada (PCV13).  ? Vacuna antineumocócica de polisacáridos (PPSV23).  · Vacuna contra la gripe. A partir de los 6 meses, el jennifer debe recibir la vacuna contra la gripe todos los años. Los bebés y los niños que tienen entre 6 meses y 8 años que reciben la vacuna contra la gripe por primera vez deben recibir edmund segunda dosis al menos 4 semanas después de la primera. Después de eso, se recomienda la colocación de solo edmund única dosis por año (anual).  · Vacuna contra la hepatitis A. Los niños que no recibieron la vacuna antes de los 2 años de edad deben recibir la vacuna solo si están en riesgo de infección o si se desea la protección contra hepatitis A.  · Vacuna antimeningocócica conjugada. Deben recibir esta vacuna los niños que sufren ciertas enfermedades de alto riesgo, que están presentes en lugares donde hay brotes o que viajan a un país con edmund shakira tasa de meningitis.  Estudios  Visión  · Hágale controlar la vista al jennifer cada 2 años, siempre y cuando no tenga síntomas de problemas de visión. Es importante detectar y tratar los problemas en los ojos desde un comienzo para que no interfieran en el desarrollo del jennifer ni en hansen aptitud escolar.  · Si se detecta un problema en los ojos, es posible que haya que controlarle la vista todos los años (en lugar de cada 2 años). Al jennifer también:  ? Se le podrán recetar anteojos.  ? Se le podrán realizar más pruebas.  ? Se le podrá indicar que consulte a un oculista.  Otras pruebas  · Hable con el pediatra del jennifer sobre la necesidad de realizar ciertos estudios de detección. Según los factores de riesgo del jennifer, el pediatra podrá realizarle pruebas de detección  de:  ? Problemas de crecimiento (de desarrollo).  ? Valores bajos en el recuento de glóbulos rojos (anemia).  ? Intoxicación con plomo.  ? Tuberculosis (TB).  ? Colesterol alto.  ? Nivel alto de azúcar en la latesha (glucosa).  · El pediatra determinará el IMC (índice de masa muscular) del jennifer para evaluar si hay obesidad.  · El jennifer debe someterse a controles de la presión arterial por lo menos edmund vez al año.  Instrucciones generales  Consejos de paternidad    · Reconozca los deseos del jennifer de tener privacidad e independencia. Cuando lo considere adecuado, jacinto al jennifer la oportunidad de resolver problemas por sí solo. Aliente al jennifer a que pida ayuda cuando la necesite.  · Concho con el docente del jennifer regularmente para saber cómo se desempeña en la escuela.  · Pregúntele al jennifer con frecuencia cómo van las cosas en la escuela y con los amigos. Jacinto importancia a las preocupaciones del jennifer y converse sobre lo que puede hacer para aliviarlas.  · Hable con el jennifer sobre la seguridad, lo que incluye la seguridad en la valverde, la bicicleta, el agua, la plaza y los deportes.  · Fomente la actividad física diaria. Realice caminatas o salidas en bicicleta con el jennifer. El objetivo debe ser que el jennifer realice 1 hora de actividad física todos los skinny.  · Jacinto al jennifer algunas tareas para que sandra en el hogar. Es importante que el jennifer comprenda que usted espera que él realice esas tareas.  · Establezca límites en lo que respecta al comportamiento. Háblele sobre las consecuencias del comportamiento tan y el shalonda. Elogie y premie los comportamientos positivos, las mejoras y los logros.  · Corrija o discipline al jennifer en privado. Sea coherente y elba con la disciplina.  · No golpee al jennifer ni permita que el jennifer golpee a otros.  · Hable con el médico si rose que el jennifer es hiperactivo, los períodos de atención que presenta son demasiado cortos o es muy olvidadizo.  · La curiosidad sexual es común. Responda a las  preguntas sobre sexualidad en términos ricki y correctos.  Geovanna bucal  · Al jennifer se le seguirán cayendo los dientes de leche. Además, los dientes permanentes continuarán saliendo, viktoria los primeros dientes posteriores (primeros molares) y los dientes delanteros (incisivos).  · Siga controlando al jennifer cuando se cepilla los dientes y aliéntelo a que utilice hilo dental con regularidad. Asegúrese de que el jennifer se cepille dos veces por día (por la mañana y antes de ir a la cama) y use pasta dental con fluoruro.  · Programe visitas regulares al dentista para el jennifer. Consulte al dentista si el jennifer necesita:  ? Selladores en los dientes permanentes.  ? Tratamiento para corregirle la mordida o enderezarle los dientes.  · Adminístrele suplementos con fluoruro de acuerdo con las indicaciones del pediatra.  Acme  · A esta edad, los niños necesitan dormir entre 9 y 12 horas por día. Asegúrese de que el jennifer duerma lo suficiente. La falta de sueño puede afectar la participación del jennifer en las actividades cotidianas.  · Continúe con las rutinas de horarios para irse a la cama. Leer cada noche antes de irse a la cama puede ayudar al jennifer a relajarse.  · Procure que el jennifer no zahraa televisión antes de irse a dormir.  Evacuación  · Todavía puede ser normal que el jennifer moje la cama thompson la noche, especialmente los varones, o si hay antecedentes familiares de mojar la cama.  · Es mejor no castigar al jennifer por orinarse en la cama.  · Si el jennifer se orina thompson el día y la noche, comuníquese con el médico.  ¿Cuándo volver?  Cheema próxima visita al médico será cuando el jennifer tenga 7 años.  Resumen  · Hable sobre la necesidad de aplicar inmunizaciones y de realizar estudios de detección con el pediatra.  · Al jennifer se le seguirán cayendo los dientes de leche. Además, los dientes permanentes continuarán saliendo, viktoria los primeros dientes posteriores (primeros molares) y los dientes delanteros (incisivos). Asegúrese de que el  jennifer se cepille los dientes dos veces al día con pasta dental con fluoruro.  · Asegúrese de que el jennifer duerma lo suficiente. La falta de sueño puede afectar la participación del jennifer en las actividades cotidianas.  · Fomente la actividad física diaria. Realice caminatas o salidas en bicicleta con el jennifer. El objetivo debe ser que el jennifer realice 1 hora de actividad física todos los días.  · Hable con el médico si rose que el jennifer es hiperactivo, los períodos de atención que presenta son demasiado cortos o es muy olvidadizo.  Esta información no tiene viktoria fin reemplazar el consejo del médico. Asegúrese de hacerle al médico cualquier pregunta que tenga.  Document Released: 01/06/2009 Document Revised: 10/08/2018 Document Reviewed: 10/08/2018  Elsevier Interactive Patient Education © 2019 [employer]     Admission

## 2023-06-23 LAB
ALBUMIN SERPL ELPH-MCNC: 3.6 G/DL — SIGNIFICANT CHANGE UP (ref 3.5–5.2)
ALP SERPL-CCNC: 72 U/L — SIGNIFICANT CHANGE UP (ref 30–115)
ALT FLD-CCNC: 15 U/L — SIGNIFICANT CHANGE UP (ref 0–41)
ANION GAP SERPL CALC-SCNC: 12 MMOL/L — SIGNIFICANT CHANGE UP (ref 7–14)
AST SERPL-CCNC: 14 U/L — SIGNIFICANT CHANGE UP (ref 0–41)
BASOPHILS # BLD AUTO: 0.03 K/UL — SIGNIFICANT CHANGE UP (ref 0–0.2)
BASOPHILS NFR BLD AUTO: 0.3 % — SIGNIFICANT CHANGE UP (ref 0–1)
BILIRUB SERPL-MCNC: 0.3 MG/DL — SIGNIFICANT CHANGE UP (ref 0.2–1.2)
BUN SERPL-MCNC: 6 MG/DL — LOW (ref 10–20)
CALCIUM SERPL-MCNC: 8.7 MG/DL — SIGNIFICANT CHANGE UP (ref 8.4–10.5)
CHLORIDE SERPL-SCNC: 102 MMOL/L — SIGNIFICANT CHANGE UP (ref 98–110)
CK SERPL-CCNC: 55 U/L — SIGNIFICANT CHANGE UP (ref 0–225)
CO2 SERPL-SCNC: 26 MMOL/L — SIGNIFICANT CHANGE UP (ref 17–32)
CREAT SERPL-MCNC: 0.8 MG/DL — SIGNIFICANT CHANGE UP (ref 0.7–1.5)
CRP SERPL-MCNC: 210.4 MG/L — HIGH
CULTURE RESULTS: SIGNIFICANT CHANGE UP
D DIMER BLD IA.RAPID-MCNC: 473 NG/ML DDU — HIGH
EGFR: 83 ML/MIN/1.73M2 — SIGNIFICANT CHANGE UP
EOSINOPHIL # BLD AUTO: 0.04 K/UL — SIGNIFICANT CHANGE UP (ref 0–0.7)
EOSINOPHIL NFR BLD AUTO: 0.4 % — SIGNIFICANT CHANGE UP (ref 0–8)
ERYTHROCYTE [SEDIMENTATION RATE] IN BLOOD: 28 MM/HR — HIGH (ref 0–20)
GLUCOSE SERPL-MCNC: 102 MG/DL — HIGH (ref 70–99)
HCT VFR BLD CALC: 36.6 % — LOW (ref 37–47)
HCV AB S/CO SERPL IA: 0.04 COI — SIGNIFICANT CHANGE UP
HCV AB SERPL-IMP: SIGNIFICANT CHANGE UP
HGB BLD-MCNC: 11.9 G/DL — LOW (ref 12–16)
IMM GRANULOCYTES NFR BLD AUTO: 0.5 % — HIGH (ref 0.1–0.3)
LYMPHOCYTES # BLD AUTO: 19.9 % — LOW (ref 20.5–51.1)
LYMPHOCYTES # BLD AUTO: 2.17 K/UL — SIGNIFICANT CHANGE UP (ref 1.2–3.4)
MAGNESIUM SERPL-MCNC: 2 MG/DL — SIGNIFICANT CHANGE UP (ref 1.8–2.4)
MCHC RBC-ENTMCNC: 29.5 PG — SIGNIFICANT CHANGE UP (ref 27–31)
MCHC RBC-ENTMCNC: 32.5 G/DL — SIGNIFICANT CHANGE UP (ref 32–37)
MCV RBC AUTO: 90.6 FL — SIGNIFICANT CHANGE UP (ref 81–99)
MONOCYTES # BLD AUTO: 0.94 K/UL — HIGH (ref 0.1–0.6)
MONOCYTES NFR BLD AUTO: 8.6 % — SIGNIFICANT CHANGE UP (ref 1.7–9.3)
NEUTROPHILS # BLD AUTO: 7.68 K/UL — HIGH (ref 1.4–6.5)
NEUTROPHILS NFR BLD AUTO: 70.3 % — SIGNIFICANT CHANGE UP (ref 42.2–75.2)
NRBC # BLD: 0 /100 WBCS — SIGNIFICANT CHANGE UP (ref 0–0)
PLATELET # BLD AUTO: 227 K/UL — SIGNIFICANT CHANGE UP (ref 130–400)
PMV BLD: 11.5 FL — HIGH (ref 7.4–10.4)
POTASSIUM SERPL-MCNC: 4 MMOL/L — SIGNIFICANT CHANGE UP (ref 3.5–5)
POTASSIUM SERPL-SCNC: 4 MMOL/L — SIGNIFICANT CHANGE UP (ref 3.5–5)
PROCALCITONIN SERPL-MCNC: 0.08 NG/ML — SIGNIFICANT CHANGE UP (ref 0.02–0.1)
PROT SERPL-MCNC: 5.8 G/DL — LOW (ref 6–8)
RBC # BLD: 4.04 M/UL — LOW (ref 4.2–5.4)
RBC # FLD: 13.2 % — SIGNIFICANT CHANGE UP (ref 11.5–14.5)
SODIUM SERPL-SCNC: 140 MMOL/L — SIGNIFICANT CHANGE UP (ref 135–146)
SPECIMEN SOURCE: SIGNIFICANT CHANGE UP
WBC # BLD: 10.91 K/UL — HIGH (ref 4.8–10.8)
WBC # FLD AUTO: 10.91 K/UL — HIGH (ref 4.8–10.8)

## 2023-06-23 PROCEDURE — 99233 SBSQ HOSP IP/OBS HIGH 50: CPT

## 2023-06-23 RX ADMIN — TRAMADOL HYDROCHLORIDE 25 MILLIGRAM(S): 50 TABLET ORAL at 10:28

## 2023-06-23 RX ADMIN — TRAMADOL HYDROCHLORIDE 25 MILLIGRAM(S): 50 TABLET ORAL at 03:20

## 2023-06-23 RX ADMIN — TRAMADOL HYDROCHLORIDE 25 MILLIGRAM(S): 50 TABLET ORAL at 02:27

## 2023-06-23 RX ADMIN — CEFTRIAXONE 100 MILLIGRAM(S): 500 INJECTION, POWDER, FOR SOLUTION INTRAMUSCULAR; INTRAVENOUS at 06:03

## 2023-06-23 RX ADMIN — TRAMADOL HYDROCHLORIDE 25 MILLIGRAM(S): 50 TABLET ORAL at 11:23

## 2023-06-23 RX ADMIN — TRAMADOL HYDROCHLORIDE 25 MILLIGRAM(S): 50 TABLET ORAL at 19:56

## 2023-06-23 RX ADMIN — Medication 100 MICROGRAM(S): at 06:01

## 2023-06-23 NOTE — PROGRESS NOTE ADULT - ASSESSMENT
62-year old patient with PMH of hypertension and hypothyroidism presenting for one day of RLQ abdominal pain associated with dark yellow vomiting, possibly in the setting of UTI.    #Abdominal Pain  #Nausea/Vomiting  - 1 day of nausea vomiting -> no vomiting in ED  - RLQ pain  - CT abdomen, RUQ US negative  - WBC count 16.63  - 103.5 fever at 1pm  - tylenol and cold compress given  - f/u blood cx and procal  - pending UCx results  - c/w ceftriaxone  - flagyl stopped  - advanced to dash diet    #HTN  - c/w amlodipine 5mg  - Lisinopril 10mg    #Hypothryoid  - c/w levothyroxine 100mcg    #Misc  - DVT Prophylaxis: Lovenox   - GI Prophylaxis: Protonix  - Diet: Clear liquid  - Activity: As tolerated  - Code Status: Full code 62-year old patient with PMH of hypertension and hypothyroidism presenting for one day of RLQ abdominal pain associated with dark yellow vomiting.    #Abdominal Pain  #Nausea/Vomiting  - RLQ pain  - CT abdomen, RUQ US negative  - Last WBC count 10.91, down from 14.4  - 103.5 fever on 6/22, down to 99.1 today  - f/u blood cx results  - UCx negative   - Procal WNL  - ESR 28,   - c/w ceftriaxone day #2  - flagyl stopped, add flagyl if new fever  - consider new imaging of abd/pelvis  - advanced to dash diet    #HTN  - c/w amlodipine 5mg  - Lisinopril 10mg    #Hypothryoid  - c/w levothyroxine 100mcg    #Misc  - DVT Prophylaxis: Lovenox   - GI Prophylaxis: Protonix  - Diet: Clear liquid  - Activity: As tolerated  - Code Status: Full code

## 2023-06-23 NOTE — PROGRESS NOTE ADULT - SUBJECTIVE AND OBJECTIVE BOX
ENMANUEL SHELTON  62y  Female      Patient is a 62y old  Female who presents with a chief complaint of Abdominal Pain (22 Jun 2023 15:42)      INTERVAL HPI/OVERNIGHT EVENTS: febrile overnight to 103F. C/o wax/wane pelvic discomfort but relieved  with pain meds      REVIEW OF SYSTEMS:  RESPIRATORY: No cough, wheezing, chills or hemoptysis; No shortness of breath  CARDIOVASCULAR: No chest pain, palpitations, dizziness, or leg swelling  NEUROLOGICAL: No headaches, memory loss, loss of strength, numbness, or tremors  SKIN: No itching, burning, rashes, or lesions   MUSCULOSKELETAL: No joint pain or swelling; No muscle, back, or extremity pain  PSYCHIATRIC: No depression, anxiety, mood swings, or difficulty sleeping  All other review of systems negative    T(C): 37.3 (06-23-23 @ 11:54), Max: 39.7 (06-22-23 @ 13:00)  HR: 88 (06-23-23 @ 11:54) (76 - 88)  BP: 118/71 (06-23-23 @ 11:54) (106/65 - 119/70)  RR: 19 (06-23-23 @ 11:54) (18 - 20)  SpO2: 98% (06-22-23 @ 13:00) (98% - 98%)  Wt(kg): --Vital Signs Last 24 Hrs  T(C): 37.3 (23 Jun 2023 11:54), Max: 39.7 (22 Jun 2023 13:00)  T(F): 99.1 (23 Jun 2023 11:54), Max: 103.5 (22 Jun 2023 13:00)  HR: 88 (23 Jun 2023 11:54) (76 - 88)  BP: 118/71 (23 Jun 2023 11:54) (106/65 - 119/70)  BP(mean): --  RR: 19 (23 Jun 2023 11:54) (18 - 20)  SpO2: 98% (22 Jun 2023 13:00) (98% - 98%)    Parameters below as of 22 Jun 2023 13:00  Patient On (Oxygen Delivery Method): room air          06-22-23 @ 07:01  -  06-23-23 @ 07:00  --------------------------------------------------------  IN: 75 mL / OUT: 0 mL / NET: 75 mL    06-23-23 @ 07:01  -  06-23-23 @ 12:12  --------------------------------------------------------  IN: 300 mL / OUT: 0 mL / NET: 300 mL        PHYSICAL EXAM:  Gen- middle-age F, mildly distressed but not toxic appearing  Eyes- anicteric sclera, non injected conjunctiva, EOMI  ENT- hearing grossly intact, oropharynx clear, MMM  Chest- symmetrical chest rise, no accessory muscle use  Cardiac- nontachycardic  Abdominal- non distended, ttp in lower left quadrant  Ext- no clubbing or cyanosis  Skin- warm, dry, intact  Neuro- AOx3, normal mentation, CN 2-12 grossly itnact       Consultant(s) Notes Reviewed:  [x ] YES  [ ] NO    Discussed with Consultants/Other Providers [ x] YES     LABS                          11.9   10.91 )-----------( 227      ( 23 Jun 2023 08:02 )             36.6     06-23    140  |  102  |  6<L>  ----------------------------<  102<H>  4.0   |  26  |  0.8    Ca    8.7      23 Jun 2023 08:02  Mg     2.0     06-23    TPro  5.8<L>  /  Alb  3.6  /  TBili  0.3  /  DBili  x   /  AST  14  /  ALT  15  /  AlkPhos  72  06-23      Urinalysis Basic - ( 23 Jun 2023 08:02 )    Color: x / Appearance: x / SG: x / pH: x  Gluc: 102 mg/dL / Ketone: x  / Bili: x / Urobili: x   Blood: x / Protein: x / Nitrite: x   Leuk Esterase: x / RBC: x / WBC x   Sq Epi: x / Non Sq Epi: x / Bacteria: x    Lactate Trend  06-21 @ 19:04 Lactate:1.1     CARDIAC MARKERS ( 23 Jun 2023 08:02 )  x     / x     / 55 U/L / x     / x        RADIOLOGY & ADDITIONAL TESTS:  - no images 6/23  Imaging Personally Reviewed:  [ ] YES  [ ] NO    HEALTH ISSUES - PROBLEM Dx:      MEDICATIONS  (STANDING):  amLODIPine   Tablet 5 milliGRAM(s) Oral daily  cefTRIAXone   IVPB 1000 milliGRAM(s) IV Intermittent every 24 hours  levothyroxine 100 MICROGram(s) Oral daily  lisinopril 10 milliGRAM(s) Oral daily    MEDICATIONS  (PRN):  acetaminophen     Tablet .. 650 milliGRAM(s) Oral every 6 hours PRN Temp greater or equal to 38C (100.4F), Mild Pain (1 - 3)  aluminum hydroxide/magnesium hydroxide/simethicone Suspension 30 milliLiter(s) Oral every 4 hours PRN Dyspepsia  melatonin 3 milliGRAM(s) Oral at bedtime PRN Insomnia  ondansetron Injectable 4 milliGRAM(s) IV Push every 8 hours PRN Nausea and/or Vomiting  traMADol 25 milliGRAM(s) Oral every 8 hours PRN Severe Pain (7 - 10)

## 2023-06-23 NOTE — PROGRESS NOTE ADULT - ASSESSMENT
62 year old female pmh hypertension and hypothyroid presenting for one day of right lower quadrant abdominal pain associated with dark yellow vomiting.     #Abdominal Pain  #Nausea/Vomiting  - unsure etiology  - currently spiking fevers  - WBC 16k-->11k  - UA grossly positive but Ucx negative  - CT abdomen pelvis no acute pathology  - RUQ, notable for steatosis but no acute pathology  - transvaginal US: Surgically absent uterus and ovaries. No adnexal or pelvic mass is seen.  - c/w ceftriaxone; stop flagyl as low concern for intrabdominal pathology  - re-ordered fever work up with procal, blood cultures, ESR, CRP  - may consider re-imaging abd/pelvis in case there has been an interval development that was missed on admission due to imaging it too early?  - Gentle hydration LR @75cc/hour  - Zofran PRN   - advance diet to regular diet       #HTN  - c/w Amlodipine 5mg  - Lisinopril 10mg     #Hypothyroid  - c/w levothyroxine 100mcg      #Misc  - DVT Prophylaxis: Lovenox   - GI Prophylaxis: Protonix  - Diet: Reg diet  - Activity: As tolerated  - IV Fluids: LR @ 75cc\hr   - Code Status: Full code    #Progress Note Handoff  Pending (specify):  follow up on blood and urine cultures, clinical improvement  Family discussion: updated at bedside  Disposition: likely home in 24hrs .

## 2023-06-23 NOTE — PROGRESS NOTE ADULT - SUBJECTIVE AND OBJECTIVE BOX
24H events:    Patient is a 62y old Female who presents with a chief complaint of Abdominal Pain (23 Jun 2023 12:11)    Today is hospital day 2. No overnight events. The patient feels pain in RLQ, making it hard to turn in bed or walk. She endorses increased urinary frequency and hesitancy. She denies dysuria.     PAST MEDICAL & SURGICAL HISTORY  GERD (gastroesophageal reflux disease)    Hypothyroidism    HTN (hypertension)    H/O varicose veins    H/O: pneumonia  s/p hysterectomy    H/O left breast biopsy    S/P hysterectomy    S/P left knee arthroscopy    H/O ligation of vein  left leg      SOCIAL HISTORY:  Social History:      ALLERGIES:  No Known Allergies    MEDICATIONS:  STANDING MEDICATIONS  amLODIPine   Tablet 5 milliGRAM(s) Oral daily  cefTRIAXone   IVPB 1000 milliGRAM(s) IV Intermittent every 24 hours  levothyroxine 100 MICROGram(s) Oral daily  lisinopril 10 milliGRAM(s) Oral daily    PRN MEDICATIONS  acetaminophen     Tablet .. 650 milliGRAM(s) Oral every 6 hours PRN  aluminum hydroxide/magnesium hydroxide/simethicone Suspension 30 milliLiter(s) Oral every 4 hours PRN  melatonin 3 milliGRAM(s) Oral at bedtime PRN  ondansetron Injectable 4 milliGRAM(s) IV Push every 8 hours PRN  traMADol 25 milliGRAM(s) Oral every 8 hours PRN    VITALS:   T(F): 99.1  HR: 85  BP: 106/68  RR: 19  SpO2: --    PHYSICAL EXAM:  GENERAL: in pain  NERVOUS SYSTEM:  Alert & Oriented X3  CHEST/LUNG: Clear to auscultation bilaterally; No rales, rhonchi, wheezing, or rubs  HEART: Regular rate and rhythm; No murmurs, rubs, or gallops  ABDOMEN: Tender to palpation in LLQ and RLQ, nondistended   EXTREMITIES: No clubbing, cyanosis, or edema    LABS:                        11.9   10.91 )-----------( 227      ( 23 Jun 2023 08:02 )             36.6     06-23    140  |  102  |  6<L>  ----------------------------<  102<H>  4.0   |  26  |  0.8    Ca    8.7      23 Jun 2023 08:02  Mg     2.0     06-23    TPro  5.8<L>  /  Alb  3.6  /  TBili  0.3  /  DBili  x   /  AST  14  /  ALT  15  /  AlkPhos  72  06-23      Urinalysis Basic - ( 23 Jun 2023 08:02 )    Color: x / Appearance: x / SG: x / pH: x  Gluc: 102 mg/dL / Ketone: x  / Bili: x / Urobili: x   Blood: x / Protein: x / Nitrite: x   Leuk Esterase: x / RBC: x / WBC x   Sq Epi: x / Non Sq Epi: x / Bacteria: x        Sedimentation Rate, Erythrocyte: 28 mm/Hr *H* (06-23-23 @ 11:34)  Creatine Kinase, Serum: 55 U/L (06-23-23 @ 08:02)      Culture - Urine (collected 21 Jun 2023 19:50)  Source: Clean Catch Clean Catch (Midstream)  Final Report (23 Jun 2023 07:52):    <10,000 CFU/mL Normal Urogenital Karoline      CARDIAC MARKERS ( 23 Jun 2023 08:02 )  x     / x     / 55 U/L / x     / x          RADIOLOGY:    US Abdomen, RUQ (6/22): Hepatic steatosis, otherwise unremarkable right quadrant abdominal ultrasound.    No cholelithiasis or sonographic evidence of acute cholecystitis.    US Transvaginal (6/21): Surgically absent uterus and ovaries. No adnexal or pelvic mass is seen.    Nonspecific trace pelvic free fluid.    CT Abdomen and pelvis (6/21): Poorly visualized uterus with pelvic clips and possible trace fluid (4/125). Correlate with clinical history.      Left iliac venous stents.    No bowel obstruction or ascites.         24H events:    Patient is a 62y old Female who presents with a chief complaint of Abdominal Pain (23 Jun 2023 12:11)    Today is hospital day 2. No overnight events. The patient feels pain in RLQ, making it hard to turn in bed or walk. She endorses increased urinary frequency and hesitancy. She denies dysuria.     PAST MEDICAL & SURGICAL HISTORY  GERD (gastroesophageal reflux disease)    Hypothyroidism    HTN (hypertension)    H/O varicose veins    H/O: pneumonia  s/p hysterectomy    H/O left breast biopsy    S/P hysterectomy    S/P left knee arthroscopy    H/O ligation of vein  left leg      SOCIAL HISTORY:  Social History:      ALLERGIES:  No Known Allergies    MEDICATIONS:  STANDING MEDICATIONS  amLODIPine   Tablet 5 milliGRAM(s) Oral daily  cefTRIAXone   IVPB 1000 milliGRAM(s) IV Intermittent every 24 hours  levothyroxine 100 MICROGram(s) Oral daily  lisinopril 10 milliGRAM(s) Oral daily    PRN MEDICATIONS  acetaminophen     Tablet .. 650 milliGRAM(s) Oral every 6 hours PRN  aluminum hydroxide/magnesium hydroxide/simethicone Suspension 30 milliLiter(s) Oral every 4 hours PRN  melatonin 3 milliGRAM(s) Oral at bedtime PRN  ondansetron Injectable 4 milliGRAM(s) IV Push every 8 hours PRN  traMADol 25 milliGRAM(s) Oral every 8 hours PRN    VITALS:   T(F): 99.1  HR: 85  BP: 106/68  RR: 19  SpO2: --    PHYSICAL EXAM:  GENERAL: comfortable, mild pain  NERVOUS SYSTEM:  Alert & Oriented X3  CHEST/LUNG: Clear to auscultation bilaterally; No rales, rhonchi, wheezing, or rubs  HEART: Regular rate and rhythm; No murmurs, rubs, or gallops  ABDOMEN: Tender to palpation in LLQ and RLQ, nondistended   EXTREMITIES: No clubbing, cyanosis, or edema    LABS:                        11.9   10.91 )-----------( 227      ( 23 Jun 2023 08:02 )             36.6     06-23    140  |  102  |  6<L>  ----------------------------<  102<H>  4.0   |  26  |  0.8    Ca    8.7      23 Jun 2023 08:02  Mg     2.0     06-23    TPro  5.8<L>  /  Alb  3.6  /  TBili  0.3  /  DBili  x   /  AST  14  /  ALT  15  /  AlkPhos  72  06-23      Urinalysis Basic - ( 23 Jun 2023 08:02 )    Color: x / Appearance: x / SG: x / pH: x  Gluc: 102 mg/dL / Ketone: x  / Bili: x / Urobili: x   Blood: x / Protein: x / Nitrite: x   Leuk Esterase: x / RBC: x / WBC x   Sq Epi: x / Non Sq Epi: x / Bacteria: x        Sedimentation Rate, Erythrocyte: 28 mm/Hr *H* (06-23-23 @ 11:34)  Creatine Kinase, Serum: 55 U/L (06-23-23 @ 08:02)      Culture - Urine (collected 21 Jun 2023 19:50)  Source: Clean Catch Clean Catch (Midstream)  Final Report (23 Jun 2023 07:52):    <10,000 CFU/mL Normal Urogenital Karoline      CARDIAC MARKERS ( 23 Jun 2023 08:02 )  x     / x     / 55 U/L / x     / x          RADIOLOGY:    US Abdomen, RUQ (6/22): Hepatic steatosis, otherwise unremarkable right quadrant abdominal ultrasound.    No cholelithiasis or sonographic evidence of acute cholecystitis.    US Transvaginal (6/21): Surgically absent uterus and ovaries. No adnexal or pelvic mass is seen.    Nonspecific trace pelvic free fluid.    CT Abdomen and pelvis (6/21): Poorly visualized uterus with pelvic clips and possible trace fluid (4/125). Correlate with clinical history.      Left iliac venous stents.    No bowel obstruction or ascites.

## 2023-06-24 LAB
ANION GAP SERPL CALC-SCNC: 9 MMOL/L — SIGNIFICANT CHANGE UP (ref 7–14)
BUN SERPL-MCNC: 9 MG/DL — LOW (ref 10–20)
CALCIUM SERPL-MCNC: 9.1 MG/DL — SIGNIFICANT CHANGE UP (ref 8.4–10.5)
CHLORIDE SERPL-SCNC: 103 MMOL/L — SIGNIFICANT CHANGE UP (ref 98–110)
CO2 SERPL-SCNC: 27 MMOL/L — SIGNIFICANT CHANGE UP (ref 17–32)
CREAT SERPL-MCNC: 0.7 MG/DL — SIGNIFICANT CHANGE UP (ref 0.7–1.5)
EGFR: 97 ML/MIN/1.73M2 — SIGNIFICANT CHANGE UP
GLUCOSE SERPL-MCNC: 95 MG/DL — SIGNIFICANT CHANGE UP (ref 70–99)
HCT VFR BLD CALC: 37.2 % — SIGNIFICANT CHANGE UP (ref 37–47)
HGB BLD-MCNC: 12.2 G/DL — SIGNIFICANT CHANGE UP (ref 12–16)
MAGNESIUM SERPL-MCNC: 2.1 MG/DL — SIGNIFICANT CHANGE UP (ref 1.8–2.4)
MCHC RBC-ENTMCNC: 29.6 PG — SIGNIFICANT CHANGE UP (ref 27–31)
MCHC RBC-ENTMCNC: 32.8 G/DL — SIGNIFICANT CHANGE UP (ref 32–37)
MCV RBC AUTO: 90.3 FL — SIGNIFICANT CHANGE UP (ref 81–99)
NRBC # BLD: 0 /100 WBCS — SIGNIFICANT CHANGE UP (ref 0–0)
PLATELET # BLD AUTO: 250 K/UL — SIGNIFICANT CHANGE UP (ref 130–400)
PMV BLD: 10.9 FL — HIGH (ref 7.4–10.4)
POTASSIUM SERPL-MCNC: 4 MMOL/L — SIGNIFICANT CHANGE UP (ref 3.5–5)
POTASSIUM SERPL-SCNC: 4 MMOL/L — SIGNIFICANT CHANGE UP (ref 3.5–5)
RBC # BLD: 4.12 M/UL — LOW (ref 4.2–5.4)
RBC # FLD: 13.2 % — SIGNIFICANT CHANGE UP (ref 11.5–14.5)
SODIUM SERPL-SCNC: 139 MMOL/L — SIGNIFICANT CHANGE UP (ref 135–146)
WBC # BLD: 8.89 K/UL — SIGNIFICANT CHANGE UP (ref 4.8–10.8)
WBC # FLD AUTO: 8.89 K/UL — SIGNIFICANT CHANGE UP (ref 4.8–10.8)

## 2023-06-24 PROCEDURE — 99232 SBSQ HOSP IP/OBS MODERATE 35: CPT | Mod: GC

## 2023-06-24 RX ORDER — SENNA PLUS 8.6 MG/1
2 TABLET ORAL AT BEDTIME
Refills: 0 | Status: DISCONTINUED | OUTPATIENT
Start: 2023-06-24 | End: 2023-06-25

## 2023-06-24 RX ORDER — POLYETHYLENE GLYCOL 3350 17 G/17G
17 POWDER, FOR SOLUTION ORAL EVERY 12 HOURS
Refills: 0 | Status: DISCONTINUED | OUTPATIENT
Start: 2023-06-24 | End: 2023-06-25

## 2023-06-24 RX ORDER — ENOXAPARIN SODIUM 100 MG/ML
40 INJECTION SUBCUTANEOUS EVERY 24 HOURS
Refills: 0 | Status: DISCONTINUED | OUTPATIENT
Start: 2023-06-24 | End: 2023-06-25

## 2023-06-24 RX ADMIN — AMLODIPINE BESYLATE 5 MILLIGRAM(S): 2.5 TABLET ORAL at 05:27

## 2023-06-24 RX ADMIN — TRAMADOL HYDROCHLORIDE 25 MILLIGRAM(S): 50 TABLET ORAL at 23:51

## 2023-06-24 RX ADMIN — TRAMADOL HYDROCHLORIDE 25 MILLIGRAM(S): 50 TABLET ORAL at 22:51

## 2023-06-24 RX ADMIN — TRAMADOL HYDROCHLORIDE 25 MILLIGRAM(S): 50 TABLET ORAL at 14:16

## 2023-06-24 RX ADMIN — POLYETHYLENE GLYCOL 3350 17 GRAM(S): 17 POWDER, FOR SOLUTION ORAL at 18:03

## 2023-06-24 RX ADMIN — TRAMADOL HYDROCHLORIDE 25 MILLIGRAM(S): 50 TABLET ORAL at 05:35

## 2023-06-24 RX ADMIN — POLYETHYLENE GLYCOL 3350 17 GRAM(S): 17 POWDER, FOR SOLUTION ORAL at 12:12

## 2023-06-24 RX ADMIN — CEFTRIAXONE 100 MILLIGRAM(S): 500 INJECTION, POWDER, FOR SOLUTION INTRAMUSCULAR; INTRAVENOUS at 05:27

## 2023-06-24 RX ADMIN — LISINOPRIL 10 MILLIGRAM(S): 2.5 TABLET ORAL at 05:27

## 2023-06-24 RX ADMIN — Medication 100 MICROGRAM(S): at 05:27

## 2023-06-24 NOTE — PROGRESS NOTE ADULT - SUBJECTIVE AND OBJECTIVE BOX
24H events:    Patient is a 62y old Female who presents with a chief complaint of Abdominal Pain (23 Jun 2023 15:16)    Today is hospital day 3d. The patient reports decreased pain in RLQ, only felt when palpating. No other acute complaints or overnight events.     PAST MEDICAL & SURGICAL HISTORY  GERD (gastroesophageal reflux disease)    Hypothyroidism    HTN (hypertension)    H/O varicose veins    H/O: pneumonia  s/p hysterectomy    H/O left breast biopsy    S/P hysterectomy    S/P left knee arthroscopy    H/O ligation of vein  left leg      SOCIAL HISTORY:  Social History:      ALLERGIES:  No Known Allergies    MEDICATIONS:  STANDING MEDICATIONS  amLODIPine   Tablet 5 milliGRAM(s) Oral daily  levothyroxine 100 MICROGram(s) Oral daily  lisinopril 10 milliGRAM(s) Oral daily    PRN MEDICATIONS  acetaminophen     Tablet .. 650 milliGRAM(s) Oral every 6 hours PRN  aluminum hydroxide/magnesium hydroxide/simethicone Suspension 30 milliLiter(s) Oral every 4 hours PRN  melatonin 3 milliGRAM(s) Oral at bedtime PRN  ondansetron Injectable 4 milliGRAM(s) IV Push every 8 hours PRN  traMADol 25 milliGRAM(s) Oral every 8 hours PRN    VITALS:   T(F): 98.3  HR: 73  BP: 132/77  RR: 18  SpO2: --    PHYSICAL EXAM:  GENERAL: NAD  HEAD:  Atraumatic, Normocephalic  NERVOUS SYSTEM:  Alert & Oriented X3  CHEST/LUNG: Clear to auscultation bilaterally; No rales, rhonchi, wheezing, or rubs  HEART: Regular rate and rhythm; No murmurs, rubs, or gallops  ABDOMEN: tender to palpation in RLQ and LLQ, nondistended  EXTREMITIES:  2+ Peripheral Pulses, No clubbing, cyanosis, or edema    LABS:                        12.2   8.89  )-----------( 250      ( 24 Jun 2023 08:58 )             37.2     06-23    140  |  102  |  6<L>  ----------------------------<  102<H>  4.0   |  26  |  0.8    Ca    8.7      23 Jun 2023 08:02  Mg     2.0     06-23    TPro  5.8<L>  /  Alb  3.6  /  TBili  0.3  /  DBili  x   /  AST  14  /  ALT  15  /  AlkPhos  72  06-23      Urinalysis Basic - ( 23 Jun 2023 08:02 )    Color: x / Appearance: x / SG: x / pH: x  Gluc: 102 mg/dL / Ketone: x  / Bili: x / Urobili: x   Blood: x / Protein: x / Nitrite: x   Leuk Esterase: x / RBC: x / WBC x   Sq Epi: x / Non Sq Epi: x / Bacteria: x        Sedimentation Rate, Erythrocyte: 28 mm/Hr *H* (06-23-23 @ 11:34)      Culture - Blood (collected 22 Jun 2023 21:03)  Source: .Blood None  Preliminary Report (24 Jun 2023 02:02):    No growth to date.    Culture - Urine (collected 21 Jun 2023 19:50)  Source: Clean Catch Clean Catch (Midstream)  Final Report (23 Jun 2023 07:52):    <10,000 CFU/mL Normal Urogenital Karoline      CARDIAC MARKERS ( 23 Jun 2023 08:02 )  x     / x     / 55 U/L / x     / x          RADIOLOGY:  US Abdomen, RUQ (6/22): Hepatic steatosis, otherwise unremarkable right quadrant abdominal ultrasound.    No cholelithiasis or sonographic evidence of acute cholecystitis.    US Transvaginal (6/21): Surgically absent uterus and ovaries. No adnexal or pelvic mass is seen.    Nonspecific trace pelvic free fluid.    CT Abdomen and pelvis (6/21): Poorly visualized uterus with pelvic clips and possible trace fluid (4/125). Correlate with clinical history.    Left iliac venous stents.    No bowel obstruction or ascites.

## 2023-06-24 NOTE — PROGRESS NOTE ADULT - ASSESSMENT
62-year old patient with PMH of hypertension and hypothyroidism presenting for one day of RLQ abdominal pain associated with dark yellow vomiting.    #Abdominal Pain  #Nausea/Vomiting  - RLQ pain  - CT abdomen, RUQ US negative  - Last WBC WNL  - T  - f/u blood cx results  - UCx negative   - Procal WNL  - ESR 28,   - c/w ceftriaxone day #2  - flagyl stopped, add flagyl if new fever  - consider new imaging of abd/pelvis  - advanced to dash diet    #HTN  - c/w amlodipine 5mg  - Lisinopril 10mg    #Hypothryoid  - c/w levothyroxine 100mcg    #Misc  - DVT Prophylaxis: Lovenox   - GI Prophylaxis: Protonix  - Diet: Clear liquid  - Activity: As tolerated  - Code Status: Full code   62-year old patient with PMH of hypertension and hypothyroidism presenting for one day of RLQ abdominal pain associated with dark yellow vomiting.    #Abdominal Pain  #Nausea/Vomiting  - RLQ pain  - CT abdomen, RUQ US negative  - Last WBC WNL  - Last temperature 98.3 F  - BCx negative  - UCx negative   - Procal WNL  - ESR 28,   - c/w ceftriaxone day #3  - flagyl stopped, add flagyl if new fever  - consider new imaging of abd/pelvis  - advanced to dash diet    #HTN  - c/w amlodipine 5mg  - Lisinopril 10mg    #Hypothryoid  - c/w levothyroxine 100mcg    #Misc  - DVT Prophylaxis: Lovenox   - GI Prophylaxis: Protonix  - Diet: Clear liquid  - Activity: As tolerated  - Code Status: Full code

## 2023-06-24 NOTE — PROGRESS NOTE ADULT - ATTENDING COMMENTS
62-year old patient with PMH of hypertension and hypothyroidism presenting for one day of RLQ abdominal pain associated with dark yellow vomiting.    #Abdominal Pain/Possible UTI  #Nausea/Vomiting--improved  - ESR 28,   - c/w ceftriaxone day #3  - advanced to dash diet  - consult GI if persistent abdominal pain despite completing abx treatment.    #HTN  - c/w amlodipine 5mg  - Lisinopril 10mg    #Hypothryoid  - c/w levothyroxine 100mcg    # Left iliac venous stent noted in CT abdomen, without any clots or other abnormality, follow up outpatient.    #Misc  - DVT Prophylaxis: Lovenox   - GI Prophylaxis: Protonix  - Diet: Clear liquid  - Activity: As tolerated  - Code Status: Full code

## 2023-06-25 ENCOUNTER — TRANSCRIPTION ENCOUNTER (OUTPATIENT)
Age: 63
End: 2023-06-25

## 2023-06-25 VITALS
DIASTOLIC BLOOD PRESSURE: 62 MMHG | HEART RATE: 65 BPM | TEMPERATURE: 99 F | SYSTOLIC BLOOD PRESSURE: 108 MMHG | RESPIRATION RATE: 18 BRPM

## 2023-06-25 LAB
ANION GAP SERPL CALC-SCNC: 11 MMOL/L — SIGNIFICANT CHANGE UP (ref 7–14)
BLD GP AB SCN SERPL QL: SIGNIFICANT CHANGE UP
BUN SERPL-MCNC: 10 MG/DL — SIGNIFICANT CHANGE UP (ref 10–20)
CALCIUM SERPL-MCNC: 9 MG/DL — SIGNIFICANT CHANGE UP (ref 8.4–10.5)
CHLORIDE SERPL-SCNC: 104 MMOL/L — SIGNIFICANT CHANGE UP (ref 98–110)
CO2 SERPL-SCNC: 29 MMOL/L — SIGNIFICANT CHANGE UP (ref 17–32)
CREAT SERPL-MCNC: 0.7 MG/DL — SIGNIFICANT CHANGE UP (ref 0.7–1.5)
EGFR: 97 ML/MIN/1.73M2 — SIGNIFICANT CHANGE UP
GLUCOSE SERPL-MCNC: 89 MG/DL — SIGNIFICANT CHANGE UP (ref 70–99)
HCT VFR BLD CALC: 37 % — SIGNIFICANT CHANGE UP (ref 37–47)
HGB BLD-MCNC: 11.9 G/DL — LOW (ref 12–16)
MCHC RBC-ENTMCNC: 29.2 PG — SIGNIFICANT CHANGE UP (ref 27–31)
MCHC RBC-ENTMCNC: 32.2 G/DL — SIGNIFICANT CHANGE UP (ref 32–37)
MCV RBC AUTO: 90.7 FL — SIGNIFICANT CHANGE UP (ref 81–99)
NRBC # BLD: 0 /100 WBCS — SIGNIFICANT CHANGE UP (ref 0–0)
PLATELET # BLD AUTO: 290 K/UL — SIGNIFICANT CHANGE UP (ref 130–400)
PMV BLD: 11.3 FL — HIGH (ref 7.4–10.4)
POTASSIUM SERPL-MCNC: 4.5 MMOL/L — SIGNIFICANT CHANGE UP (ref 3.5–5)
POTASSIUM SERPL-SCNC: 4.5 MMOL/L — SIGNIFICANT CHANGE UP (ref 3.5–5)
RBC # BLD: 4.08 M/UL — LOW (ref 4.2–5.4)
RBC # FLD: 13.1 % — SIGNIFICANT CHANGE UP (ref 11.5–14.5)
SODIUM SERPL-SCNC: 144 MMOL/L — SIGNIFICANT CHANGE UP (ref 135–146)
WBC # BLD: 7.03 K/UL — SIGNIFICANT CHANGE UP (ref 4.8–10.8)
WBC # FLD AUTO: 7.03 K/UL — SIGNIFICANT CHANGE UP (ref 4.8–10.8)

## 2023-06-25 PROCEDURE — 99232 SBSQ HOSP IP/OBS MODERATE 35: CPT

## 2023-06-25 RX ORDER — LEVOTHYROXINE SODIUM 125 MCG
1 TABLET ORAL
Qty: 0 | Refills: 0 | DISCHARGE

## 2023-06-25 RX ORDER — CEFPODOXIME PROXETIL 100 MG
1 TABLET ORAL
Qty: 6 | Refills: 0
Start: 2023-06-25 | End: 2023-06-27

## 2023-06-25 RX ADMIN — Medication 100 MICROGRAM(S): at 05:18

## 2023-06-25 RX ADMIN — TRAMADOL HYDROCHLORIDE 25 MILLIGRAM(S): 50 TABLET ORAL at 08:43

## 2023-06-25 RX ADMIN — TRAMADOL HYDROCHLORIDE 25 MILLIGRAM(S): 50 TABLET ORAL at 14:10

## 2023-06-25 RX ADMIN — TRAMADOL HYDROCHLORIDE 25 MILLIGRAM(S): 50 TABLET ORAL at 14:48

## 2023-06-25 NOTE — DISCHARGE NOTE PROVIDER - HOSPITAL COURSE
62-year old patient with PMH of hypertension and hypothyroidism presenting for one day of RLQ abdominal pain associated with dark yellow vomiting.    #Abdominal Pain/Possible UTI  #Nausea/Vomiting--improved  improved  complete abx course    #HTN  - c/w amlodipine 5mg  - Lisinopril 10mg    #Hypothryoid  - c/w levothyroxine 100mcg    # Left iliac venous stent noted in CT abdomen, without any clots or other abnormality, follow up outpatient.

## 2023-06-25 NOTE — PROGRESS NOTE ADULT - ASSESSMENT
62-year old patient with PMH of hypertension and hypothyroidism presenting for one day of RLQ abdominal pain associated with dark yellow vomiting.    #Abdominal Pain/Possible UTI  #Nausea/Vomiting--improved  - ESR 28,   - c/w ceftriaxone day #3  - advanced to dash diet  - symptoms improved    #HTN  - c/w amlodipine 5mg  - Lisinopril 10mg    #Hypothryoid  - c/w levothyroxine 100mcg    # Left iliac venous stent noted in CT abdomen, without any clots or other abnormality, follow up outpatient.    #Misc  - DVT Prophylaxis: Lovenox   - GI Prophylaxis: Protonix  - Diet: Clear liquid  - Activity: As tolerated  - Code Status: Full code     Discharge home today

## 2023-06-25 NOTE — DISCHARGE NOTE PROVIDER - NSDCFUSCHEDAPPT_GEN_ALL_CORE_FT
Unknown, Doctor  [facility] PreAdmits  Scheduled Appointment: 08/23/2023    [facility] Physician [facility]  [address]  Scheduled Appointment: 08/23/2023    [facility]  BREAST [address]  Scheduled Appointment: 09/11/2023

## 2023-06-25 NOTE — DISCHARGE NOTE PROVIDER - CARE PROVIDER_API CALL
PCP,   Phone: (   )    -  Fax: (   )    -  Follow Up Time:    PCP,   Phone: (   )    -  Fax: (   )    -  Follow Up Time:     Victor Hugo Solorzano  Gastroenterology  [address] [place] [address]  Phone: [phone]  Fax: [phone]  Follow Up Time: 1 month    Ob/GYN,   Phone: (   )    -  Fax: (   )    -  Follow Up Time:

## 2023-06-25 NOTE — CONSULT NOTE ADULT - SUBJECTIVE AND OBJECTIVE BOX
Gastroenterology Consultation:    Patient is a 62y old  Female who presents with a chief complaint of Abdominal Pain (25 Jun 2023 10:56)      Admitted on: 06-22-23  HPI:  62 year old female pmh hypertension and hypothyroid presenting for one day of right lower quadrant abdominal pain associated with dark yellow vomiting one day prior to ED visit. Patient says that around midnight the day before coming to ED she awoke to sharp stabbing abdominal pain. She then began vomiting dark yellow vomitus multiple times throughout the day. She is unable to keep anything down including water and tea. Patient states she has had something like this before many years ago when she was in [facility], they did an endoscopy but doesn't remember what they found admitted to medicine for further work up. GI was consulted for abdominal pain, CUrrently she is able to tolerate diet denies any nausea, vomiting having BM pain not related to Bm or food intake.     CT abdomen and pelvis did not show any acute intrabdominal pathology, just surgical clips from prior hysterectomy and possible trace fluid.       Prior EGD: in [facility] none on chart   Prior Colonoscopy: >5 years ago none on chart as per patient normal       PAST MEDICAL & SURGICAL HISTORY:  GERD (gastroesophageal reflux disease)      Hypothyroidism      HTN (hypertension)      H/O varicose veins      H/O: pneumonia  s/p hysterectomy      H/O left breast biopsy      S/P hysterectomy      S/P left knee arthroscopy      H/O ligation of vein  left leg          FAMILY HISTORY:  no family h/o Gi malignancy     Social History:  Tobacco: N  Alcohol: N  Drugs: N    Home Medications:  amlodipine-benazepril 5 mg-10 mg oral capsule: 1 orally X1 A DAY (25 Apr 2023 09:29)  levothyroxine 100 mcg (0.1 mg) oral tablet: 1 tablet orally once a day X1 (25 Jun 2023 11:04)  Osphena 60 mg oral tablet: 1 orally X1 A DAY (25 Apr 2023 09:29)    MEDICATIONS  (STANDING):  amLODIPine   Tablet 5 milliGRAM(s) Oral daily  enoxaparin Injectable 40 milliGRAM(s) SubCutaneous every 24 hours  levothyroxine 100 MICROGram(s) Oral daily  lisinopril 10 milliGRAM(s) Oral daily  polyethylene glycol 3350 17 Gram(s) Oral every 12 hours  senna 2 Tablet(s) Oral at bedtime    MEDICATIONS  (PRN):  acetaminophen     Tablet .. 650 milliGRAM(s) Oral every 6 hours PRN Temp greater or equal to 38C (100.4F), Mild Pain (1 - 3)  aluminum hydroxide/magnesium hydroxide/simethicone Suspension 30 milliLiter(s) Oral every 4 hours PRN Dyspepsia  melatonin 3 milliGRAM(s) Oral at bedtime PRN Insomnia  ondansetron Injectable 4 milliGRAM(s) IV Push every 8 hours PRN Nausea and/or Vomiting  traMADol 25 milliGRAM(s) Oral every 8 hours PRN Severe Pain (7 - 10)      Allergies  No Known Allergies      Review of Systems:   Constitutional:  No Fever, No Chills  ENT/Mouth:  No Hearing Changes,  No Difficulty Swallowing  Eyes:  No Eye Pain, No Vision Changes  Cardiovascular:  No Chest Pain, No Palpitations  Respiratory:  No Cough, No Dyspnea  Gastrointestinal:  As described in HPI  Musculoskeletal:  No Joint Swelling, No Back Pain  Skin:  No Skin Lesions, No Jaundice  Neuro:  No Syncope, No Dizziness  Heme/Lymph:  No Bruising, No Bleeding.          Physical Examination:  T(C): 37.2 (06-25-23 @ 12:49), Max: 37.2 (06-25-23 @ 12:49)  HR: 65 (06-25-23 @ 12:49) (63 - 71)  BP: 108/62 (06-25-23 @ 12:49) (108/62 - 129/73)  RR: 18 (06-25-23 @ 12:49) (16 - 18)  SpO2: --      06-23-23 @ 07:01  -  06-24-23 @ 07:00  --------------------------------------------------------  IN: 300 mL / OUT: 0 mL / NET: 300 mL        Constitutional: No acute distress.  Eyes:. Conjunctivae are clear, Sclera is non-icteric.  Ears Nose and Throat: The external ears are normal appearing,  Oral mucosa is pink and moist.  Respiratory:  No signs of respiratory distress. Lung sounds are clear bilaterally.  Cardiovascular:  S1 S2, Regular rate and rhythm.  GI: Abdomen is soft, symmetric, and non-tender without distention.           Data:                        11.9   7.03  )-----------( 290      ( 25 Jun 2023 08:11 )             37.0     Hgb Trend:  11.9  06-25-23 @ 08:11  12.2  06-24-23 @ 08:58  11.9  06-23-23 @ 08:02        06-25    144  |  104  |  10  ----------------------------<  89  4.5   |  29  |  0.7    Ca    9.0      25 Jun 2023 08:11  Mg     2.1     06-24      Liver panel trend:  TBili 0.3   /   AST 14   /   ALT 15   /   AlkP 72   /   Tptn 5.8   /   Alb 3.6    /   DBili --      06-23  TBili 0.4   /   AST 12   /   ALT 15   /   AlkP 71   /   Tptn 5.9   /   Alb 3.8    /   DBili --      06-22  TBili 0.5   /   AST 17   /   ALT 19   /   AlkP 87   /   Tptn 7.3   /   Alb 4.8    /   DBili <0.2      06-21          Culture - Blood (collected 22 Jun 2023 21:03)  Source: .Blood None  Preliminary Report (24 Jun 2023 02:02):    No growth to date.          Radiology:  < from: US Abdomen Upper Quadrant Right (06.22.23 @ 04:47) >    TECHNIQUE: Sonography of the right upper quadrant.    FINDINGS:  Liver: Echogenic liver parenchyma suggestive of underlying hepatic   steatosis  Bile ducts: Normal caliber. Common bile duct measures 0.5 cm.  Gallbladder: Within normal limits.  Pancreas: Visualized portions are within normal limits.  Right kidney: 11.7 cm in length. No hydronephrosis.  Ascites: None.  IVC: Visualized portions are within normal limits.    IMPRESSION:  Hepatic steatosis, otherwise unremarkable right quadrant abdominal   ultrasound.    No cholelithiasis or sonographic evidence of acute cholecystitis.    < end of copied text >    < from: CT Abdomen and Pelvis w/ IV Cont (06.21.23 @ 20:16) >  INTERPRETATION:  Reason for study: Abdominal pain    TECHNIQUE: Multislice helical sections were obtained from the domes of   the diaphragm to the pubic symphysis after 100 cc of Omnipaque 350   intravenous contrast administration (0 cc discarded.    Multiplanar reformats in the sagittal and coronal planes were performed.    COMPARISON: None.    FINDINGS:    LOWER THORAX: Unremarkable    HEPATOBILIARY: Unremarkable.    SPLEEN: Unremarkable.    PANCREAS: Unremarkable.    ADRENAL GLANDS: Unremarkable.    KIDNEYS: Unremarkable.    ABDOMINAL NODES: Unremarkable.    PERITONEUM/MESENTERY/BOWEL: Unremarkable.    PELVIC ORGANS: Poorly visualized uterus with pelvic clips and possible   trace fluid (4/125). Correlate with clinical history.    BONES/SOFT TISSUES: Unremarkable.    OTHER: Left iliac venous stent.. Patent unremarkable abdominal aorta and   iliac arteries..    IMPRESSION:  Poorly visualized uterus with pelvic clips and possible trace fluid   (4/125). Correlate with clinical history.      Left iliac venous stents.    No bowel obstruction or ascites.    < end of copied text >

## 2023-06-25 NOTE — CONSULT NOTE ADULT - ASSESSMENT
62 year old female pmh hypertension and hypothyroid presenting for one day of right lower quadrant abdominal pain associated with dark yellow vomiting one day prior to ED visit. Patient says that around midnight the day before coming to ED she awoke to sharp stabbing abdominal pain. She then began vomiting dark yellow vomitus multiple times throughout the day. She is unable to keep anything down including water and tea. Patient states she has had something like this before many years ago when she was in [facility], they did an endoscopy but doesn't remember what they found admitted to medicine for further work up. GI was consulted for abdominal pain,     #)RLQ pain DD: less likely related to GI r/o other causes   Currently she is able to tolerate diet denies any nausea, vomiting having BM pain not related to Bm or food intake.   US abdomen showed hepatic steatosis, no gall stones   CT abdomen showed negative for Gi pathology   hemodynamically stable   Hb stable   normal BM   no alarm signs and symptoms   initially has fever which was resolved, inc CRP   r/o any other sources of infection   follow up with GI as an OP for colonoscopy (last colon 5 years ago no reports available)

## 2023-06-25 NOTE — PROGRESS NOTE ADULT - SUBJECTIVE AND OBJECTIVE BOX
SUBJECTIVE:    Patient is a 62y old Female who presents with a chief complaint of Abdominal Pain (24 Jun 2023 09:56)    Currently admitted to medicine with the primary diagnosis of Abdominal pain       Today is hospital day 3d. This morning she is resting comfortably in bed and reports no new issues or overnight events.     PAST MEDICAL & SURGICAL HISTORY  GERD (gastroesophageal reflux disease)    Hypothyroidism    HTN (hypertension)    H/O varicose veins    H/O: pneumonia  s/p hysterectomy    H/O left breast biopsy    S/P hysterectomy    S/P left knee arthroscopy    H/O ligation of vein  left leg         ALLERGIES:  No Known Allergies    MEDICATIONS:  STANDING MEDICATIONS  amLODIPine   Tablet 5 milliGRAM(s) Oral daily  enoxaparin Injectable 40 milliGRAM(s) SubCutaneous every 24 hours  levothyroxine 100 MICROGram(s) Oral daily  lisinopril 10 milliGRAM(s) Oral daily  polyethylene glycol 3350 17 Gram(s) Oral every 12 hours  senna 2 Tablet(s) Oral at bedtime    PRN MEDICATIONS  acetaminophen     Tablet .. 650 milliGRAM(s) Oral every 6 hours PRN  aluminum hydroxide/magnesium hydroxide/simethicone Suspension 30 milliLiter(s) Oral every 4 hours PRN  melatonin 3 milliGRAM(s) Oral at bedtime PRN  ondansetron Injectable 4 milliGRAM(s) IV Push every 8 hours PRN  traMADol 25 milliGRAM(s) Oral every 8 hours PRN    VITALS:   T(F): 97.7  HR: 63  BP: 118/77  RR: 16  SpO2: --    LABS:                        11.9   7.03  )-----------( 290      ( 25 Jun 2023 08:11 )             37.0     06-24    139  |  103  |  9<L>  ----------------------------<  95  4.0   |  27  |  0.7    Ca    9.1      24 Jun 2023 08:58  Mg     2.1     06-24        Urinalysis Basic - ( 24 Jun 2023 08:58 )    Color: x / Appearance: x / SG: x / pH: x  Gluc: 95 mg/dL / Ketone: x  / Bili: x / Urobili: x   Blood: x / Protein: x / Nitrite: x   Leuk Esterase: x / RBC: x / WBC x   Sq Epi: x / Non Sq Epi: x / Bacteria: x      Culture - Blood (collected 22 Jun 2023 21:03)  Source: .Blood None  Preliminary Report (24 Jun 2023 02:02):  No growth to date.        PHYSICAL EXAM:  GEN: No acute distress  LUNGS: Clear to auscultation bilaterally   HEART: Regular  ABD: Soft, non-tender, non-distended.  EXT: NC/NC/NE/2+PP/ESTES/Skin Intact.   NEURO: AAOX3

## 2023-06-25 NOTE — DISCHARGE NOTE PROVIDER - NSDCMRMEDTOKEN_GEN_ALL_CORE_FT
amlodipine-benazepril 5 mg-10 mg oral capsule: 1 orally X1 A DAY  levothyroxine 100 mcg (0.1 mg) oral tablet: 1 tablet orally once a day X1  Osphena 60 mg oral tablet: 1 orally X1 A DAY   amlodipine-benazepril 5 mg-10 mg oral capsule: 1 orally X1 A DAY  cefpodoxime 200 mg oral tablet: 1 tab(s) orally 2 times a day  levothyroxine 100 mcg (0.1 mg) oral tablet: 1 tablet orally once a day X1  Osphena 60 mg oral tablet: 1 orally X1 A DAY

## 2023-06-25 NOTE — DISCHARGE NOTE PROVIDER - PROVIDER TOKENS
FREE:[LAST:[PCP],PHONE:[(   )    -],FAX:[(   )    -]] FREE:[LAST:[PCP],PHONE:[(   )    -],FAX:[(   )    -]],PROVIDER:[TOKEN:[[id number]],FOLLOWUP:[1 month]],FREE:[LAST:[Ob/GYN],PHONE:[(   )    -],FAX:[(   )    -]]

## 2023-06-25 NOTE — DISCHARGE NOTE PROVIDER - NSDCCPCAREPLAN_GEN_ALL_CORE_FT
PRINCIPAL DISCHARGE DIAGNOSIS  Diagnosis: Abdominal pain  Assessment and Plan of Treatment: Your abdominal pain was likely due to urinary tract infection which is significantly improved now. You have completed antibiotic therapy. Please follow up with primary physician in 1 week.     PRINCIPAL DISCHARGE DIAGNOSIS  Diagnosis: Abdominal pain  Assessment and Plan of Treatment: Your abdominal pain was likely due to urinary tract infection which is significantly improved now. Please complete the antibiotic therapy. Please follow up with primary physician in 1 week.

## 2023-06-25 NOTE — DISCHARGE NOTE NURSING/CASE MANAGEMENT/SOCIAL WORK - PATIENT PORTAL LINK FT
You can access the FollowMyHealth Patient Portal offered by [facility] by registering at the following website: [email or web address]. By joining [facility]’s FollowMyHealth portal, you will also be able to view your health information using other applications (apps) compatible with our system.

## 2023-06-26 ENCOUNTER — APPOINTMENT (OUTPATIENT)
Dept: PLASTIC SURGERY | Facility: CLINIC | Age: 63
End: 2023-06-26

## 2023-06-26 LAB — OB PNL STL: NEGATIVE — SIGNIFICANT CHANGE UP

## 2023-06-28 DIAGNOSIS — N39.0 URINARY TRACT INFECTION, SITE NOT SPECIFIED: ICD-10-CM

## 2023-06-28 DIAGNOSIS — E03.9 HYPOTHYROIDISM, UNSPECIFIED: ICD-10-CM

## 2023-06-28 DIAGNOSIS — Z90.710 ACQUIRED ABSENCE OF BOTH CERVIX AND UTERUS: ICD-10-CM

## 2023-06-28 DIAGNOSIS — I10 ESSENTIAL (PRIMARY) HYPERTENSION: ICD-10-CM

## 2023-06-28 LAB
CULTURE RESULTS: SIGNIFICANT CHANGE UP
SPECIMEN SOURCE: SIGNIFICANT CHANGE UP

## 2023-07-05 ENCOUNTER — APPOINTMENT (OUTPATIENT)
Dept: PLASTIC SURGERY | Facility: CLINIC | Age: 63
End: 2023-07-05
Payer: COMMERCIAL

## 2023-07-05 PROCEDURE — 99024 POSTOP FOLLOW-UP VISIT: CPT

## 2023-07-05 NOTE — HISTORY OF PRESENT ILLNESS
[FreeTextEntry1] : 63 yo F with PMHx of HTN, Hypothyroid and GERD who is RHD and presents today for evaluation of new firm forehead lesion for the past several months and left middle finger cyst for few years with increasing size and slight discomfort on contact. Denies any paresthesia, numbness in digits. \par Pt has h/o premalignant back skin lesion otherwise no h/o Skin Cancer. \par No imaging. \par \par Occupation - retired \par Former smoker \par \par Denies ASA\par \par Interval hx (5/3/23). Pt here POD#8 s/p excision of forehead exostosis and left MF skin lesion. Doing well c/o facial bruising and swelling. Denies any f/c, bleeding or limited ROM of MF. \par \par Interval hx (5/10/23): Pt 2 weeks s/p excision of forehead exostosis and left MF dermatofibroma. Doing well however concerned over forehead appearance/prominence. No fever/chills/drainage \par \par \par interval hx (7/5/23):  10 weeks s/p  excision of forehead exostosis and left MF dermatofibroma. Doing well.  Happy with results and contour of forehead. Performing scar massage and scar cream application.   Interested in botox.  c/o eyebrow lowering and asymmetrical eyebrows.  had previous botox with worsening eyebrow dropping.  Interested in repeat botox by different physician

## 2023-07-05 NOTE — PHYSICAL EXAM
[de-identified] : well developed female, NAD [de-identified] : central forehead incision healing well, excellent contour,  intact forehead animation without soft tissue tethering on animation.  Facial nerve exam intact  [de-identified] : no lid ptosis BL\par +brow ptosis and lateral hooding; worsens with brow confrontration [de-identified] : unlabored breathing  [de-identified] : ERICR [de-identified] : soft, nontender  [de-identified] : Left hand - warm and well perfused, FROM/SILT, incision over volar aspect of MF PIPJ healing well, c/d/i, no erythema

## 2023-07-05 NOTE — ASSESSMENT
[FreeTextEntry1] : 61 yo F with central forehead exostosis and left MF PIPJ subcutaneous cyst. \par \par 10 weeks s/p excision of central forehead exostosis and left middle finger dermatofibroma. Doing well. \par \par Brow ptosis\par Do not recommend botox; recommend brow lift (post-trichial)\par \par - completed scar cream\par - path reviewed; Exostosis & dermatofibroma - both benign\par - c/w scar massage forearm and LMF\par - Regarding brow ptosis treatment, recommend brow lift.  B/R/A reviewed.  Pt declined at this time.\par - finger aquaphor daily \par - post-op instructions reviewed and all her questions were answered to the patient's apparent satisfaction\par - f/u 6 weeks with , pt also interested in botox at  that time

## 2023-07-05 NOTE — DATA REVIEWED
[FreeTextEntry1] : \par  Pathology             Final\par \par No Documents Attached\par \par \par \par \par   [facility] Accession Number : [id number]\par Patient:   ENMANUEL SHELTON\par \par \par Accession:                             [id number]\par \par Collected Date/Time:                   4/25/2023 11:25 EDT\par Received Date/Time:                    4/25/2023 14:30 EDT\par \par Surgical Pathology Report - Auth (Verified)\par \par Specimen(s) Submitted\par 1  Exostosis forehead\par 2  Left middle finger skin lesion\par \par Final Diagnosis\par 1.  Exostosis, forehead:\par -  Dense cortical bone tissue consistent with exostosis.\par \par \par 2.  Skin lesion, left middle finger:\par -  Dermatofibroma.\par Verified by: Paris Syed M.D.\par (Electronic Signature)\par Reported on: 05/04/23 16:45 EDT, [facility],\par [address] [place] [address]\par Phone: [phone]   Fax: [phone]\par _________________________________________________________________\par \par \par Clinical Information\par Excision of forehead exostosis and left finger mass\par \par Perioperative Diagnosis\par Forehead exostosis and left middle finger mass\par \par Gross Description\par 1.  The specimen is received in formalin labeled "exostosis forehead" and\par consists of a tan-white soft tissue fragment measuring 0.6 x 0.3 x 0.3\par cm.  The specimen is entirely submitted.  (1 block)\par \par 2.  The specimen is received in formalin labeled "left middle finger skin\par lesion" and consists of two tan-white soft tissue fragments measuring\par 0.7 x 0.6 x 0.2 cm in aggregate.  The specimen is entirely submitted.  (1\par block)\par \par Specimen was received and underwent gross examination at [place]\[facility], [address] [place] [address].\par \par 04/25/2023 17:12:20 EDT  rc\par \par  \par \par  Ordered by: JUSTUS MAYO       Collected/Examined: 25Apr2023 11:25AM       \par Verification Required       Stage: Final       \par  Performed at: [facility]       Resulted: [id number] 04:45PM       Last Updated: 04May2023 04:45PM       Accession: [id number]

## 2023-08-23 ENCOUNTER — RESULT REVIEW (OUTPATIENT)
Age: 63
End: 2023-08-23

## 2023-08-23 ENCOUNTER — OUTPATIENT (OUTPATIENT)
Dept: OUTPATIENT SERVICES | Facility: HOSPITAL | Age: 63
LOS: 1 days | End: 2023-08-23
Payer: COMMERCIAL

## 2023-08-23 DIAGNOSIS — Z98.890 OTHER SPECIFIED POSTPROCEDURAL STATES: Chronic | ICD-10-CM

## 2023-08-23 DIAGNOSIS — Z90.710 ACQUIRED ABSENCE OF BOTH CERVIX AND UTERUS: Chronic | ICD-10-CM

## 2023-08-23 DIAGNOSIS — R92.8 OTHER ABNORMAL AND INCONCLUSIVE FINDINGS ON DIAGNOSTIC IMAGING OF BREAST: ICD-10-CM

## 2023-08-23 PROCEDURE — 76642 ULTRASOUND BREAST LIMITED: CPT | Mod: LT

## 2023-08-23 PROCEDURE — 76642 ULTRASOUND BREAST LIMITED: CPT | Mod: 26,LT

## 2023-08-24 DIAGNOSIS — R92.8 OTHER ABNORMAL AND INCONCLUSIVE FINDINGS ON DIAGNOSTIC IMAGING OF BREAST: ICD-10-CM

## 2023-09-07 NOTE — PROGRESS NOTE ADULT - REASON FOR ADMISSION
Abdominal Pain
Clindamycin Counseling: I counseled the patient regarding use of clindamycin as an antibiotic for prophylactic and/or therapeutic purposes. Clindamycin is active against numerous classes of bacteria, including skin bacteria. Side effects may include nausea, diarrhea, gastrointestinal upset, rash, hives, yeast infections, and in rare cases, colitis.

## 2023-09-11 ENCOUNTER — APPOINTMENT (OUTPATIENT)
Dept: BREAST CENTER | Facility: CLINIC | Age: 63
End: 2023-09-11

## 2024-02-12 ENCOUNTER — OUTPATIENT (OUTPATIENT)
Dept: OUTPATIENT SERVICES | Facility: HOSPITAL | Age: 64
LOS: 1 days | End: 2024-02-12
Payer: COMMERCIAL

## 2024-02-12 DIAGNOSIS — Z90.710 ACQUIRED ABSENCE OF BOTH CERVIX AND UTERUS: Chronic | ICD-10-CM

## 2024-02-12 DIAGNOSIS — Z98.890 OTHER SPECIFIED POSTPROCEDURAL STATES: Chronic | ICD-10-CM

## 2024-02-12 DIAGNOSIS — R92.8 OTHER ABNORMAL AND INCONCLUSIVE FINDINGS ON DIAGNOSTIC IMAGING OF BREAST: ICD-10-CM

## 2024-02-12 DIAGNOSIS — Z00.8 ENCOUNTER FOR OTHER GENERAL EXAMINATION: ICD-10-CM

## 2024-02-12 PROCEDURE — G0279: CPT

## 2024-02-12 PROCEDURE — 77066 DX MAMMO INCL CAD BI: CPT | Mod: 26

## 2024-02-12 PROCEDURE — 76642 ULTRASOUND BREAST LIMITED: CPT | Mod: 26,LT

## 2024-02-12 PROCEDURE — 76642 ULTRASOUND BREAST LIMITED: CPT | Mod: LT

## 2024-02-12 PROCEDURE — G0279: CPT | Mod: 26

## 2024-02-12 PROCEDURE — 77066 DX MAMMO INCL CAD BI: CPT

## 2024-02-13 DIAGNOSIS — R92.8 OTHER ABNORMAL AND INCONCLUSIVE FINDINGS ON DIAGNOSTIC IMAGING OF BREAST: ICD-10-CM
